# Patient Record
Sex: MALE | Race: WHITE | Employment: OTHER | URBAN - METROPOLITAN AREA
[De-identification: names, ages, dates, MRNs, and addresses within clinical notes are randomized per-mention and may not be internally consistent; named-entity substitution may affect disease eponyms.]

---

## 2018-12-05 ENCOUNTER — OFFICE VISIT (OUTPATIENT)
Dept: ORTHOPEDIC SURGERY | Age: 78
End: 2018-12-05
Payer: MEDICARE

## 2018-12-05 VITALS — BODY MASS INDEX: 21 KG/M2 | WEIGHT: 150 LBS | HEIGHT: 71 IN

## 2018-12-05 DIAGNOSIS — M75.102 TEAR OF LEFT ROTATOR CUFF, UNSPECIFIED TEAR EXTENT: ICD-10-CM

## 2018-12-05 DIAGNOSIS — M25.512 LEFT SHOULDER PAIN, UNSPECIFIED CHRONICITY: Primary | ICD-10-CM

## 2018-12-05 PROCEDURE — 99203 OFFICE O/P NEW LOW 30 MIN: CPT | Performed by: ORTHOPAEDIC SURGERY

## 2018-12-05 RX ORDER — MELOXICAM 15 MG/1
15 TABLET ORAL DAILY
Qty: 30 TABLET | Refills: 2 | Status: SHIPPED | OUTPATIENT
Start: 2018-12-05

## 2018-12-05 NOTE — PROGRESS NOTES
test: not tested       Sulcus sign: not tested       Bear Hug test: not tested       Rohm and Boateng test: not tested       Lift-Off test: not tested       Yergason's test: not tested    Strength: Forward flexion: 4/5        Abduction: 3/5        Internal Rotation: 3+/5        External Rotation: 4+/5    Neurologic & Vascular: Sensation to intact to light touch throughout median, ulnar and radial nerve distribution. The bilateral upper extremities are warm and well-perfused with brisk capillary refill. Additional Examinations:  Right Upper Extremity:  Examination of the right upper extremity does not show any tenderness, deformity or injury. Range of motion is within normal limits. There is no gross instability. There are no rashes, ulcerations or lesions. Strength and tone are normal.  Neck: Examination of the neck does not show any tenderness, deformity or injury. Range of motion is within normal limits. There is no gross instability. There are no rashes, ulcerations or lesions. Strength and tone are normal.      DIAGNOSTICS:  Xrays obtained in office today: Yes  Xrays reviewed today: Yes  4 views of the left shoulder show   Fracture: No  Dislocation: No  Acromion morphology: Type II   Acromioclavicular joint arthritis: moderate  Glenohumeral joint arthritis: mild  Humeral head superior migration: none      ASSESSMENT (Medical Decision Making)    Francis Barragan is a 66 y.o. male with the following diagnosis: Left shoulder possible rotator cuff tear versus rotator cuff tendinitis      ICD-10-CM    1.  Left shoulder pain, unspecified chronicity M25.512 XR SHOULDER LEFT (MIN 2 VIEWS)   2. Tear of left rotator cuff, unspecified tear extent M75.102        His overall course is unchanged despite conservative treatment      PLAN (Medical Decision Making)  Office Procedures:  Orders Placed This Encounter   Procedures    XR SHOULDER LEFT (MIN 2 VIEWS)       Treatment Plan:    I discussed the diagnosis and treatment note is accurate.

## 2022-12-12 ENCOUNTER — HOSPITAL ENCOUNTER (OUTPATIENT)
Age: 82
Setting detail: OBSERVATION
Discharge: HOME | End: 2022-12-15
Attending: EMERGENCY MEDICINE | Admitting: INTERNAL MEDICINE
Payer: MEDICARE

## 2022-12-12 ENCOUNTER — APPOINTMENT (OUTPATIENT)
Dept: GENERAL RADIOLOGY | Age: 82
End: 2022-12-12
Payer: MEDICARE

## 2022-12-12 DIAGNOSIS — R07.9 CHEST PAIN, UNSPECIFIED TYPE: Primary | ICD-10-CM

## 2022-12-12 DIAGNOSIS — Z85.72 HX OF LYMPHOMA, NON-HODGKINS: ICD-10-CM

## 2022-12-12 LAB
A/G RATIO: 1.2 (ref 1.1–2.2)
ALBUMIN SERPL-MCNC: 3.6 G/DL (ref 3.4–5)
ALP BLD-CCNC: 86 U/L (ref 40–129)
ALT SERPL-CCNC: 13 U/L (ref 10–40)
ANION GAP SERPL CALCULATED.3IONS-SCNC: 10 MMOL/L (ref 3–16)
APTT: 31 SEC (ref 23–34.3)
AST SERPL-CCNC: 14 U/L (ref 15–37)
BILIRUB SERPL-MCNC: 0.4 MG/DL (ref 0–1)
BUN BLDV-MCNC: 17 MG/DL (ref 7–20)
CALCIUM SERPL-MCNC: 9.1 MG/DL (ref 8.3–10.6)
CHLORIDE BLD-SCNC: 96 MMOL/L (ref 99–110)
CO2: 24 MMOL/L (ref 21–32)
CREAT SERPL-MCNC: 1.2 MG/DL (ref 0.8–1.3)
GFR SERPL CREATININE-BSD FRML MDRD: >60 ML/MIN/{1.73_M2}
GLUCOSE BLD-MCNC: 114 MG/DL (ref 70–99)
HCT VFR BLD CALC: 39.2 % (ref 40.5–52.5)
HEMOGLOBIN: 13.2 G/DL (ref 13.5–17.5)
INR BLD: 1.08 (ref 0.87–1.14)
MCH RBC QN AUTO: 31.2 PG (ref 26–34)
MCHC RBC AUTO-ENTMCNC: 33.6 G/DL (ref 31–36)
MCV RBC AUTO: 93 FL (ref 80–100)
PDW BLD-RTO: 14.2 % (ref 12.4–15.4)
PLATELET # BLD: 303 K/UL (ref 135–450)
PMV BLD AUTO: 7.3 FL (ref 5–10.5)
POTASSIUM SERPL-SCNC: 4.3 MMOL/L (ref 3.5–5.1)
PROTHROMBIN TIME: 13.9 SEC (ref 11.7–14.5)
RBC # BLD: 4.22 M/UL (ref 4.2–5.9)
SODIUM BLD-SCNC: 130 MMOL/L (ref 136–145)
TOTAL CK: 58 U/L (ref 39–308)
TOTAL PROTEIN: 6.7 G/DL (ref 6.4–8.2)
TROPONIN: <0.01 NG/ML
WBC # BLD: 8.1 K/UL (ref 4–11)

## 2022-12-12 PROCEDURE — 71045 X-RAY EXAM CHEST 1 VIEW: CPT

## 2022-12-12 PROCEDURE — 80053 COMPREHEN METABOLIC PANEL: CPT

## 2022-12-12 PROCEDURE — 85730 THROMBOPLASTIN TIME PARTIAL: CPT

## 2022-12-12 PROCEDURE — 85610 PROTHROMBIN TIME: CPT

## 2022-12-12 PROCEDURE — 82550 ASSAY OF CK (CPK): CPT

## 2022-12-12 PROCEDURE — 99285 EMERGENCY DEPT VISIT HI MDM: CPT

## 2022-12-12 PROCEDURE — 84484 ASSAY OF TROPONIN QUANT: CPT

## 2022-12-12 PROCEDURE — 85027 COMPLETE CBC AUTOMATED: CPT

## 2022-12-12 ASSESSMENT — ENCOUNTER SYMPTOMS
SORE THROAT: 0
DIARRHEA: 0
SHORTNESS OF BREATH: 0
ABDOMINAL PAIN: 0
COUGH: 0
NAUSEA: 0
VOMITING: 0
CHEST TIGHTNESS: 0
BACK PAIN: 0

## 2022-12-12 ASSESSMENT — LIFESTYLE VARIABLES
HOW MANY STANDARD DRINKS CONTAINING ALCOHOL DO YOU HAVE ON A TYPICAL DAY: PATIENT DOES NOT DRINK
HOW OFTEN DO YOU HAVE A DRINK CONTAINING ALCOHOL: NEVER

## 2022-12-12 ASSESSMENT — PAIN - FUNCTIONAL ASSESSMENT: PAIN_FUNCTIONAL_ASSESSMENT: NONE - DENIES PAIN

## 2022-12-13 ENCOUNTER — APPOINTMENT (OUTPATIENT)
Dept: NUCLEAR MEDICINE | Age: 82
End: 2022-12-13
Payer: MEDICARE

## 2022-12-13 LAB
ANION GAP SERPL CALCULATED.3IONS-SCNC: 8 MMOL/L (ref 3–16)
BUN BLDV-MCNC: 15 MG/DL (ref 7–20)
CALCIUM SERPL-MCNC: 8.6 MG/DL (ref 8.3–10.6)
CHLORIDE BLD-SCNC: 102 MMOL/L (ref 99–110)
CHOLESTEROL, TOTAL: 124 MG/DL (ref 0–199)
CO2: 27 MMOL/L (ref 21–32)
CREAT SERPL-MCNC: 0.9 MG/DL (ref 0.8–1.3)
EKG ATRIAL RATE: 83 BPM
EKG DIAGNOSIS: NORMAL
EKG P AXIS: 57 DEGREES
EKG P-R INTERVAL: 176 MS
EKG Q-T INTERVAL: 372 MS
EKG QRS DURATION: 88 MS
EKG QTC CALCULATION (BAZETT): 437 MS
EKG R AXIS: -8 DEGREES
EKG T AXIS: 43 DEGREES
EKG VENTRICULAR RATE: 83 BPM
GFR SERPL CREATININE-BSD FRML MDRD: >60 ML/MIN/{1.73_M2}
GLUCOSE BLD-MCNC: 97 MG/DL (ref 70–99)
HCT VFR BLD CALC: 40 % (ref 40.5–52.5)
HDLC SERPL-MCNC: 51 MG/DL (ref 40–60)
HEMOGLOBIN: 13.3 G/DL (ref 13.5–17.5)
LDL CHOLESTEROL CALCULATED: 63 MG/DL
MCH RBC QN AUTO: 31.1 PG (ref 26–34)
MCHC RBC AUTO-ENTMCNC: 33.2 G/DL (ref 31–36)
MCV RBC AUTO: 93.4 FL (ref 80–100)
PDW BLD-RTO: 13.9 % (ref 12.4–15.4)
PLATELET # BLD: 288 K/UL (ref 135–450)
PMV BLD AUTO: 7.4 FL (ref 5–10.5)
POTASSIUM REFLEX MAGNESIUM: 5.1 MMOL/L (ref 3.5–5.1)
RBC # BLD: 4.28 M/UL (ref 4.2–5.9)
SODIUM BLD-SCNC: 137 MMOL/L (ref 136–145)
TRIGL SERPL-MCNC: 50 MG/DL (ref 0–150)
TROPONIN: <0.01 NG/ML
TROPONIN: <0.01 NG/ML
VLDLC SERPL CALC-MCNC: 10 MG/DL
WBC # BLD: 6.9 K/UL (ref 4–11)

## 2022-12-13 PROCEDURE — G0378 HOSPITAL OBSERVATION PER HR: HCPCS

## 2022-12-13 PROCEDURE — 93017 CV STRESS TEST TRACING ONLY: CPT

## 2022-12-13 PROCEDURE — 80048 BASIC METABOLIC PNL TOTAL CA: CPT

## 2022-12-13 PROCEDURE — 96360 HYDRATION IV INFUSION INIT: CPT

## 2022-12-13 PROCEDURE — 96361 HYDRATE IV INFUSION ADD-ON: CPT

## 2022-12-13 PROCEDURE — 99205 OFFICE O/P NEW HI 60 MIN: CPT | Performed by: INTERNAL MEDICINE

## 2022-12-13 PROCEDURE — 2580000003 HC RX 258: Performed by: NURSE PRACTITIONER

## 2022-12-13 PROCEDURE — 36415 COLL VENOUS BLD VENIPUNCTURE: CPT

## 2022-12-13 PROCEDURE — 6370000000 HC RX 637 (ALT 250 FOR IP): Performed by: INTERNAL MEDICINE

## 2022-12-13 PROCEDURE — A9502 TC99M TETROFOSMIN: HCPCS | Performed by: NURSE PRACTITIONER

## 2022-12-13 PROCEDURE — 80061 LIPID PANEL: CPT

## 2022-12-13 PROCEDURE — 6370000000 HC RX 637 (ALT 250 FOR IP): Performed by: NURSE PRACTITIONER

## 2022-12-13 PROCEDURE — 93306 TTE W/DOPPLER COMPLETE: CPT

## 2022-12-13 PROCEDURE — 84484 ASSAY OF TROPONIN QUANT: CPT

## 2022-12-13 PROCEDURE — 3430000000 HC RX DIAGNOSTIC RADIOPHARMACEUTICAL: Performed by: NURSE PRACTITIONER

## 2022-12-13 PROCEDURE — 85027 COMPLETE CBC AUTOMATED: CPT

## 2022-12-13 PROCEDURE — 78452 HT MUSCLE IMAGE SPECT MULT: CPT

## 2022-12-13 PROCEDURE — 6370000000 HC RX 637 (ALT 250 FOR IP): Performed by: PHYSICIAN ASSISTANT

## 2022-12-13 RX ORDER — TRAMADOL HYDROCHLORIDE 50 MG/1
50 TABLET ORAL EVERY 6 HOURS PRN
Status: DISCONTINUED | OUTPATIENT
Start: 2022-12-13 | End: 2022-12-15 | Stop reason: HOSPADM

## 2022-12-13 RX ORDER — ACETAMINOPHEN 325 MG/1
650 TABLET ORAL EVERY 6 HOURS PRN
Status: DISCONTINUED | OUTPATIENT
Start: 2022-12-13 | End: 2022-12-15 | Stop reason: HOSPADM

## 2022-12-13 RX ORDER — SODIUM CHLORIDE 9 MG/ML
INJECTION, SOLUTION INTRAVENOUS CONTINUOUS
Status: ACTIVE | OUTPATIENT
Start: 2022-12-13 | End: 2022-12-13

## 2022-12-13 RX ORDER — POLYETHYLENE GLYCOL 3350 17 G/17G
17 POWDER, FOR SOLUTION ORAL DAILY PRN
Status: DISCONTINUED | OUTPATIENT
Start: 2022-12-13 | End: 2022-12-15 | Stop reason: HOSPADM

## 2022-12-13 RX ORDER — LOSARTAN POTASSIUM 100 MG/1
100 TABLET ORAL DAILY
Status: DISCONTINUED | OUTPATIENT
Start: 2022-12-13 | End: 2022-12-15 | Stop reason: HOSPADM

## 2022-12-13 RX ORDER — MIRTAZAPINE 15 MG/1
30 TABLET, FILM COATED ORAL NIGHTLY
Status: DISCONTINUED | OUTPATIENT
Start: 2022-12-13 | End: 2022-12-14

## 2022-12-13 RX ORDER — SIMVASTATIN 40 MG
40 TABLET ORAL NIGHTLY
Status: DISCONTINUED | OUTPATIENT
Start: 2022-12-13 | End: 2022-12-13

## 2022-12-13 RX ORDER — ONDANSETRON 2 MG/ML
4 INJECTION INTRAMUSCULAR; INTRAVENOUS EVERY 6 HOURS PRN
Status: DISCONTINUED | OUTPATIENT
Start: 2022-12-13 | End: 2022-12-15 | Stop reason: HOSPADM

## 2022-12-13 RX ORDER — ATORVASTATIN CALCIUM 80 MG/1
80 TABLET, FILM COATED ORAL NIGHTLY
Status: DISCONTINUED | OUTPATIENT
Start: 2022-12-13 | End: 2022-12-15 | Stop reason: HOSPADM

## 2022-12-13 RX ORDER — SODIUM CHLORIDE 0.9 % (FLUSH) 0.9 %
5-40 SYRINGE (ML) INJECTION EVERY 12 HOURS SCHEDULED
Status: DISCONTINUED | OUTPATIENT
Start: 2022-12-13 | End: 2022-12-15 | Stop reason: HOSPADM

## 2022-12-13 RX ORDER — NITROGLYCERIN 0.4 MG/1
0.4 TABLET SUBLINGUAL EVERY 5 MIN PRN
Status: DISCONTINUED | OUTPATIENT
Start: 2022-12-13 | End: 2022-12-15 | Stop reason: HOSPADM

## 2022-12-13 RX ORDER — SODIUM CHLORIDE 0.9 % (FLUSH) 0.9 %
5-40 SYRINGE (ML) INJECTION PRN
Status: DISCONTINUED | OUTPATIENT
Start: 2022-12-13 | End: 2022-12-15 | Stop reason: HOSPADM

## 2022-12-13 RX ORDER — ACETAMINOPHEN 650 MG/1
650 SUPPOSITORY RECTAL EVERY 6 HOURS PRN
Status: DISCONTINUED | OUTPATIENT
Start: 2022-12-13 | End: 2022-12-15 | Stop reason: HOSPADM

## 2022-12-13 RX ORDER — ENOXAPARIN SODIUM 100 MG/ML
40 INJECTION SUBCUTANEOUS DAILY
Status: DISCONTINUED | OUTPATIENT
Start: 2022-12-13 | End: 2022-12-15 | Stop reason: HOSPADM

## 2022-12-13 RX ORDER — ASPIRIN 325 MG
325 TABLET ORAL ONCE
Status: COMPLETED | OUTPATIENT
Start: 2022-12-13 | End: 2022-12-13

## 2022-12-13 RX ORDER — ATORVASTATIN CALCIUM 10 MG/1
20 TABLET, FILM COATED ORAL NIGHTLY
Status: DISCONTINUED | OUTPATIENT
Start: 2022-12-13 | End: 2022-12-13

## 2022-12-13 RX ORDER — ASPIRIN 81 MG/1
81 TABLET ORAL DAILY
Status: DISCONTINUED | OUTPATIENT
Start: 2022-12-13 | End: 2022-12-15 | Stop reason: HOSPADM

## 2022-12-13 RX ORDER — LORAZEPAM 0.5 MG/1
0.5 TABLET ORAL NIGHTLY PRN
Status: DISCONTINUED | OUTPATIENT
Start: 2022-12-13 | End: 2022-12-15 | Stop reason: HOSPADM

## 2022-12-13 RX ORDER — MONTELUKAST SODIUM 10 MG/1
10 TABLET ORAL NIGHTLY
Status: DISCONTINUED | OUTPATIENT
Start: 2022-12-13 | End: 2022-12-15 | Stop reason: HOSPADM

## 2022-12-13 RX ORDER — ONDANSETRON 4 MG/1
4 TABLET, ORALLY DISINTEGRATING ORAL EVERY 8 HOURS PRN
Status: DISCONTINUED | OUTPATIENT
Start: 2022-12-13 | End: 2022-12-15 | Stop reason: HOSPADM

## 2022-12-13 RX ORDER — PRAMIPEXOLE DIHYDROCHLORIDE 0.25 MG/1
0.25 TABLET ORAL NIGHTLY
Status: DISCONTINUED | OUTPATIENT
Start: 2022-12-13 | End: 2022-12-14

## 2022-12-13 RX ORDER — SODIUM CHLORIDE 9 MG/ML
INJECTION, SOLUTION INTRAVENOUS PRN
Status: DISCONTINUED | OUTPATIENT
Start: 2022-12-13 | End: 2022-12-15 | Stop reason: HOSPADM

## 2022-12-13 RX ADMIN — SODIUM CHLORIDE: 9 INJECTION, SOLUTION INTRAVENOUS at 02:24

## 2022-12-13 RX ADMIN — ASPIRIN 325 MG: 325 TABLET ORAL at 00:55

## 2022-12-13 RX ADMIN — MIRTAZAPINE 30 MG: 15 TABLET, FILM COATED ORAL at 22:08

## 2022-12-13 RX ADMIN — LORAZEPAM 0.5 MG: 0.5 TABLET ORAL at 22:07

## 2022-12-13 RX ADMIN — Medication 10 ML: at 22:15

## 2022-12-13 RX ADMIN — ATORVASTATIN CALCIUM 80 MG: 80 TABLET, FILM COATED ORAL at 22:08

## 2022-12-13 RX ADMIN — PRAMIPEXOLE DIHYDROCHLORIDE 0.25 MG: 0.25 TABLET ORAL at 22:08

## 2022-12-13 RX ADMIN — TETROFOSMIN 11 MILLICURIE: 1.38 INJECTION, POWDER, LYOPHILIZED, FOR SOLUTION INTRAVENOUS at 08:07

## 2022-12-13 RX ADMIN — LOSARTAN POTASSIUM 100 MG: 100 TABLET, FILM COATED ORAL at 08:36

## 2022-12-13 RX ADMIN — ASPIRIN 81 MG: 81 TABLET, COATED ORAL at 08:36

## 2022-12-13 RX ADMIN — TETROFOSMIN 33.6 MILLICURIE: 1.38 INJECTION, POWDER, LYOPHILIZED, FOR SOLUTION INTRAVENOUS at 10:17

## 2022-12-13 ASSESSMENT — PAIN - FUNCTIONAL ASSESSMENT: PAIN_FUNCTIONAL_ASSESSMENT: NONE - DENIES PAIN

## 2022-12-13 ASSESSMENT — HEART SCORE: ECG: 0

## 2022-12-13 NOTE — PLAN OF CARE
Problem: Discharge Planning  Goal: Discharge to home or other facility with appropriate resources  Outcome: Progressing  Flowsheets  Taken 12/13/2022 0250  Discharge to home or other facility with appropriate resources: Identify barriers to discharge with patient and caregiver  Taken 12/13/2022 0203  Discharge to home or other facility with appropriate resources: Identify barriers to discharge with patient and caregiver     Problem: Safety - Adult  Goal: Free from fall injury  Outcome: Progressing

## 2022-12-13 NOTE — H&P
Hospital Medicine History & Physical      PCP: Nilo Castro MD    Date of Admission: 12/12/2022    Date of Service: Pt seen/examined on 12/13/2022 and Admitted to observation with expected LOS less than two midnights due to medical therapy. Chief Complaint: Chest pain    History Of Present Illness:      80 y.o. male, with past medical history of hypertension, hyperlipidemia and CAD, who presented to [facility] with chest pain. History obtained from patient and review of EMR. The patient stated this evening he was sitting in his recliner watching television when he developed 6/10 right-sided chest pain. He describes the pain as sharp and stabbing pain that radiated to the right and left side of his neck, but was not associated with any other symptoms. The patient stated he did take 1 nitro tablet and the pain subsided. the patient stated he does have a history of CAD with a CABG in 2008. He stated he does follow with Dr. Vidya Rader from cardiology at [facility].  In the emergency room the patient had a negative troponin as well as a nonischemic EKG. The patient was admitted for further evaluation and treatment. A stress test and echocardiogram have been ordered for the a.m. The patient denied any other associated symptoms as well as any aggravating and/or alleviating factors. At the time of this assessment, the patient was resting comfortably in bed. He currently denies any chest pain, back pain, abdominal pain, shortness of breath, numbness, tingling, N/V/C/D, fever and/or chills.      Past Medical History:          Diagnosis Date    Arthritis     Benign neoplasm of colon     Bronchitis     CAD (coronary artery disease)     Cervicalgia     Chronic prostatitis     Coronary atherosclerosis of unspecified type of vessel, native or graft     Depressive disorder, not elsewhere classified     Esophageal reflux     Generalized anxiety disorder     Hyperlipidemia     Hypertension     Impotence of organic origin     Insomnia, unspecified     Irritable bowel syndrome     Lumbago     Orthodontics     UPPER FRONT BRIDGE/CAPS    Other diseases of lung, not elsewhere classified     CT OF LUNGS, 2/07, 5/07, 4/08    Unspecified essential hypertension      Past Surgical History:          Procedure Laterality Date    BALLOON ANGIOPLASTY, ARTERY  2002, 2004    PERIPH ART    BYPASS GRAFT  2006 COMPOS/2 VEINS/2 [facility] SURGERY  7/31/2012    ANTERIOR CERVICAL DISCECTOMY AND FUSION C7-T1    COLONOSCOPY  2003    CORONARY ANGIOPLASTY WITH STENT PLACEMENT  2001, 2004    TRANSCATH STENT EACH ADDN VESSL,OPEN    CORONARY ARTERY BYPASS GRAFT  10/2008    TWO    COSMETIC SURGERY      DEVIATED SEPTUM    ENDOSCOPY, COLON, DIAGNOSTIC      EYE SURGERY      CATARACT, LEFT, INSERT LENS     Medications Prior to Admission:      Prior to Admission medications    Medication Sig Start Date End Date Taking? Authorizing Provider   meloxicam (MOBIC) 15 MG tablet Take 1 tablet by mouth daily 12/5/18   Winnieparul Christina PA-C   Montelukast Sodium (SINGULAIR PO) Take  by mouth. Historical Provider, MD   nitroGLYCERIN (NITROSTAT) 0.4 MG SL tablet Place 1 tablet under the tongue every 5 minutes as needed for Chest pain. 1/7/14   Khloe Maikel, DO   aspirin EC 81 MG EC tablet Take 1 tablet by mouth daily. 1/7/14   Karrie Stapleton, DO   lansoprazole (PREVACID SOLUTAB) 30 MG disintegrating tablet Take 30 mg by mouth daily. Historical Provider, MD   simvastatin (ZOCOR) 40 MG tablet Take 40 mg by mouth nightly. Historical Provider, MD   traMADol (ULTRAM) 50 MG tablet Take 50 mg by mouth every 6 hours as needed. Historical Provider, MD   losartan (COZAAR) 100 MG tablet Take 100 mg by mouth daily. Historical Provider, MD   pramipexole (MIRAPEX) 0.25 MG tablet Take 0.25 mg by mouth nightly. Historical Provider, MD   triazolam (HALCION) 0.25 MG tablet Take 0.25 mg by mouth nightly as needed. Historical Provider, MD   polyethylene glycol (GLYCOLAX) powder Take 17 g by mouth daily. Historical Provider, MD   nitroGLYCERIN (NITROSTAT) 0.4 MG SL tablet Place 0.4 mg under the tongue every 5 minutes as needed. Historical Provider, MD     Allergies:  Bromocriptine, Gabapentin, and Zetia [ezetimibe]    Social History:      The patient currently lives at home    TOBACCO:   reports that he quit smoking about 29 years ago. He has a 20.00 pack-year smoking history. He has never used smokeless tobacco.  ETOH:   reports current alcohol use. E-cigarette/Vaping       Questions Responses    E-cigarette/Vaping Use     Start Date     Passive Exposure     Quit Date     Counseling Given     Comments           Family History:      Reviewed and negative in regards to presenting illness/complaint. Problem Relation Age of Onset    Heart Disease Mother     Osteoarthritis Brother     Heart Disease Brother     High Blood Pressure Brother     High Blood Pressure Brother      REVIEW OF SYSTEMS COMPLETED:   Pertinent positives as noted in the HPI. All other systems reviewed and negative. PHYSICAL EXAM PERFORMED:    /68   Pulse 65   Temp 98.2 °F (36.8 °C) (Oral)   Resp 16   Ht 5' 10\" (1.778 m)   Wt 152 lb (68.9 kg)   SpO2 95%   BMI 21.81 kg/m²     General appearance: Pleasant, elderly male in no apparent distress, appears stated age and cooperative. HEENT: Pupils equal, round, and reactive to light. Extra ocular muscles intact. Conjunctivae/corneas clear. Neck: Supple, with full range of motion. No jugular venous distention. Trachea midline. Respiratory:  Normal respiratory effort. Clear to auscultation, bilaterally without Rales/Wheezes/Rhonchi. Cardiovascular:  Regular rate and rhythm with normal S1/S2 without murmurs, rubs or gallops. Abdomen: Soft, non-tender, non-distended with normal bowel sounds. Musculoskeletal:  No clubbing, cyanosis or edema bilaterally.   Full range of motion without deformity. Skin: Skin color, texture, turgor normal.  No significant rashes or lesions. Neurologic:  Neurovascularly intact  Cranial nerves: II-XII intact, grossly non-focal.  Psychiatric:  Alert and oriented, thought content appropriate, normal insight  Capillary Refill: Brisk,3 seconds, normal  Peripheral Pulses: +2 palpable, equal bilaterally     Labs:     Recent Labs     12/12/22 2325   WBC 8.1   HGB 13.2*   HCT 39.2*        Recent Labs     12/12/22 2325   *   K 4.3   CL 96*   CO2 24   BUN 17   CREATININE 1.2   CALCIUM 9.1     Recent Labs     12/12/22 2325   AST 14*   ALT 13   BILITOT 0.4   ALKPHOS 86     Recent Labs     12/12/22 2325   INR 1.08     Recent Labs     12/12/22 2325   CKTOTAL 58   TROPONINI <0.01     Urinalysis:      Lab Results   Component Value Date/Time    BLOODU Trace intact 03/19/2013 01:34 PM    SPECGRAV <=1.005 03/19/2013 01:34 PM    GLUCOSEU Negative 03/19/2013 01:34 PM     Radiology:     CXR: I have reviewed the CXR with the following interpretation: minimal basilar atelectasis, otherwise no acute process    EKG:  I have reviewed the EKG with the following interpretation: The Ekg interpreted in the absence of a cardiologist shows. Sinus rhythm with Premature atrial complexes. Nonspecific T wave abnormality. Abnormal ECG. When compared with ECG of 09-FEB-2018 06:48, No significant change was found    XR CHEST PORTABLE   Final Result   Minimal basilar atelectasis, otherwise no acute process. Consults:    None    ASSESSMENT:    Active Hospital Problems    Diagnosis Date Noted    Hyperlipidemia [E78.5] 03/19/2013    HTN (hypertension) [I10] 03/19/2013    Chest pain [R07.9] 03/19/2013     PLAN:    Chest pain in setting of known CAD.  S/p OHS  -atypical  -serial troponin - initial negative  -EKG w/o ischemic changes  -ASA given in ED  -nitro in EMS  -FLP in am  -NPO after MN  -stress in am  -echo in am  -ASA and statin daily  -prn nitro  -tele monitoring    Essential HTN  -continue losartan    HLD  -continue simvastatin    Hyponatremia  -monitor with IVF  -bmp in am    Chronic normocytic anemia  -no s/s of bleeding at this time  -cbc in am    DVT Prophylaxis: Lovenox    Diet: No diet orders on file    Code Status: Prior    PT/OT Eval Status: No indication for need at this time    Dispo - 1-2 days pending clinical improvement   Jasbir Cevallos, APRN - CNP    Thank you Tamica Marrero MD for the opportunity to be involved in this patient's care.  If you have any questions or concerns please feel free to contact me at [phone].  -------------------------Dr. Sandra Guy-----------------------------

## 2022-12-13 NOTE — PLAN OF CARE
Problem: Safety - Adult  Goal: Free from fall injury  12/13/2022 1231 by Erica Barrera RN  Outcome: Progressing

## 2022-12-13 NOTE — ED NOTES
Patient sleeping in cart. Easily arousable. Patient denies any chest pain at this time. Patient resting comfortably in cart. Warm blanket provided. No other needs at this time. Will continue to monitor.      Raad Arciniega RN  12/13/22 5658

## 2022-12-13 NOTE — PROGRESS NOTES
Reviewed after overnight admission. Cardiac stress test abnormal with some scar and possible surrounding ischemia. Unclear if patient requires cardiac cath. However, we will await cardiology to see before making further decisions.     Will increase statin dose to high intensity    Keep the patient n.p.o. until seen by cardiology      Electronically signed by Raphael Barreto MD on 12/13/2022 at 1:31 PM

## 2022-12-13 NOTE — CONSULTS
[address] [facility]  [phone]      Attending Physician: Shan Blizzard, MD  Reason for Consultation/Chief Complaint: Chest pain, abnormal stress test    Subjective   History of Present Illness:  Sanitago Jaquez is a 80 y.o. male with a history of CAD s/p PCI to LAD in 2001 and PCI to RCA in 2004 followed by CABG in 2008, ischemic cardiomyopathy, non-Hodgkin's lymphoma (reportedly in remission), essential hypertension, hyperlipidemia, COPD, and tobacco use who presented with chest pain. The patient reports that yesterday while sitting in his chair, he developed sudden onset sharp midsternal chest pain that radiated to his neck. He denies any associated shortness of breath or diaphoresis. His pain lasted for approximately 15 minutes and resolved shortly after taking an SL nitroglycerin tablet. On arrival to the ED, the patient was afebrile and hemodynamically stable with oxygen saturation 95% on room air. His EKG showed normal sinus rhythm with PACs and non-specific T wave abnormality. Troponin T was negative x3. Chest x-ray showed minimal basilar atelectasis, but otherwise no acute pulmonary process. A GXT nuclear SPECT stress test was performed earlier today during which the patient exercised on a Khris protocol for 5 minutes and achieved 87% of his age-predicted maximal heart rate. No ischemic EKG changes were seen. Nuclear SPECT imaging demonstrated a moderate-sized basal to mid-anterolateral area of mixed ischemia/scar.     Cardiology has now been consulted for further evaluation of the patient's chest pain and abnormal nuclear SPECT stress test.    Past Medical History:   has a past medical history of Arthritis, Benign neoplasm of colon, Bronchitis, CAD (coronary artery disease), Cervicalgia, Chronic prostatitis, Coronary atherosclerosis of unspecified type of vessel, native or graft, Depressive disorder, not elsewhere classified, Esophageal reflux, Generalized anxiety disorder, Hyperlipidemia, Hypertension, Impotence of organic origin, Insomnia, unspecified, Irritable bowel syndrome, Lumbago, Orthodontics, Other diseases of lung, not elsewhere classified, and Unspecified essential hypertension. Surgical History:   has a past surgical history that includes Cardiac surgery; Cosmetic surgery; Endoscopy, colon, diagnostic; Balloon angioplasty, artery (2002, 2004); Coronary artery bypass graft (10/2008); eye surgery; Colonoscopy (2003); Bypass Graft (2006); Coronary angioplasty with stent (2001, 2004); and Cervical spine surgery (7/31/2012). Social History:   reports that he quit smoking about 29 years ago. He has a 20.00 pack-year smoking history. He has never used smokeless tobacco. He reports current alcohol use. He reports that he does not use drugs. Family History:  family history includes Heart Disease in his brother and mother; High Blood Pressure in his brother and brother; Osteoarthritis in his brother. Home Medications:  Were reviewed and are listed in nursing record and/or below  Prior to Admission medications    Medication Sig Start Date End Date Taking? Authorizing Provider   meloxicam (MOBIC) 15 MG tablet Take 1 tablet by mouth daily 12/5/18   Stephanie Vann PA-C   Montelukast Sodium (SINGULAIR PO) Take  by mouth. Historical Provider, MD   nitroGLYCERIN (NITROSTAT) 0.4 MG SL tablet Place 1 tablet under the tongue every 5 minutes as needed for Chest pain. 1/7/14   Demetra Monte, DO   aspirin EC 81 MG EC tablet Take 1 tablet by mouth daily. 1/7/14   Karrie Stapleton, DO   lansoprazole (PREVACID SOLUTAB) 30 MG disintegrating tablet Take 30 mg by mouth daily. Historical Provider, MD   simvastatin (ZOCOR) 40 MG tablet Take 40 mg by mouth nightly. Historical Provider, MD   traMADol (ULTRAM) 50 MG tablet Take 50 mg by mouth every 6 hours as needed.     Patient not taking: Reported on 12/13/2022    Historical Provider, MD   losartan (COZAAR) 100 MG tablet Take 100 mg by mouth daily. Historical Provider, MD   pramipexole (MIRAPEX) 0.25 MG tablet Take 0.25 mg by mouth nightly. Historical Provider, MD   triazolam (HALCION) 0.25 MG tablet Take 0.25 mg by mouth nightly as needed. Historical Provider, MD   polyethylene glycol (GLYCOLAX) powder Take 17 g by mouth daily. Historical Provider, MD   nitroGLYCERIN (NITROSTAT) 0.4 MG SL tablet Place 0.4 mg under the tongue every 5 minutes as needed. Historical Provider, MD        CURRENT Medications:  aspirin EC tablet 81 mg, Daily  losartan (COZAAR) tablet 100 mg, Daily  montelukast (SINGULAIR) tablet 10 mg, Nightly  pramipexole (MIRAPEX) tablet 0.25 mg, Nightly  traMADol (ULTRAM) tablet 50 mg, Q6H PRN  sodium chloride flush 0.9 % injection 5-40 mL, 2 times per day  sodium chloride flush 0.9 % injection 5-40 mL, PRN  0.9 % sodium chloride infusion, PRN  ondansetron (ZOFRAN-ODT) disintegrating tablet 4 mg, Q8H PRN   Or  ondansetron (ZOFRAN) injection 4 mg, Q6H PRN  acetaminophen (TYLENOL) tablet 650 mg, Q6H PRN   Or  acetaminophen (TYLENOL) suppository 650 mg, Q6H PRN  polyethylene glycol (GLYCOLAX) packet 17 g, Daily PRN  perflutren lipid microspheres (DEFINITY) injection 1.5 mL, ONCE PRN  nitroGLYCERIN (NITROSTAT) SL tablet 0.4 mg, Q5 Min PRN  enoxaparin (LOVENOX) injection 40 mg, Daily  atorvastatin (LIPITOR) tablet 80 mg, Nightly        Allergies:  Bromocriptine, Gabapentin, and Zetia [ezetimibe]     Review of Systems:   A 14 point review of symptoms was completed. Pertinent positives were identified in the HPI. All other review of symptoms negative unless otherwise noted below. Objective   PHYSICAL EXAM:    Vitals:    12/13/22 1101   BP: (!) 148/79   Pulse: 74   Resp:    Temp: 97.7 °F (36.5 °C)   SpO2: 98%    Weight: 150 lb (68 kg)       General: Adult male sitting up in chair in no acute distress.  Pleasant and interactive on exam.  HEENT: Normocephalic, atraumatic, non-icteric, hearing intact, nares normal, mucous membranes moist.  Neck: Supple, trachea midline. No adenopathy. No thyromegaly. No carotid bruits. No JVD. Heart: Regular rate and rhythm. Normal S1 and S2. Grade  II/VI holosystolic murmur. No rubs or gallops. Chest: Midline sternotomy scar present. Lungs: Normal respiratory effort. Clear to auscultation bilaterally. No wheezes, rales, or rhonchi. Abdomen: Soft, non-tender. Normoactive bowel sounds. No masses or organomegaly. Skin: No rashes, wounds, or lesions. Pulses: 2+ and symmetric. Extremities: No clubbing, cyanosis, or edema. Musculoskeletal: Spontaneously moves all four extremities. Psych: Normal mood and affect. Neuro: Alert and oriented to person, place, and time. No focal deficits noted.       Labs   CBC:   Lab Results   Component Value Date/Time    WBC 6.9 12/13/2022 06:37 AM    RBC 4.28 12/13/2022 06:37 AM    HGB 13.3 12/13/2022 06:37 AM    HCT 40.0 12/13/2022 06:37 AM    MCV 93.4 12/13/2022 06:37 AM    RDW 13.9 12/13/2022 06:37 AM     12/13/2022 06:37 AM     CMP:  Lab Results   Component Value Date/Time     12/13/2022 06:37 AM    K 5.1 12/13/2022 06:37 AM     12/13/2022 06:37 AM    CO2 27 12/13/2022 06:37 AM    BUN 15 12/13/2022 06:37 AM    CREATININE 0.9 12/13/2022 06:37 AM    GFRAA >60 02/09/2018 07:05 AM    GFRAA >60 03/19/2013 10:50 AM    AGRATIO 1.2 12/12/2022 11:25 PM    LABGLOM >60 12/13/2022 06:37 AM    GLUCOSE 97 12/13/2022 06:37 AM    PROT 6.7 12/12/2022 11:25 PM    PROT 7.4 03/19/2013 10:50 AM    CALCIUM 8.6 12/13/2022 06:37 AM    BILITOT 0.4 12/12/2022 11:25 PM    ALKPHOS 86 12/12/2022 11:25 PM    AST 14 12/12/2022 11:25 PM    ALT 13 12/12/2022 11:25 PM     PT/INR:  No results found for: PTINR  HgBA1c:  Lab Results   Component Value Date    LABA1C 5.3 01/07/2014     Lab Results   Component Value Date    CKTOTAL 58 12/12/2022    TROPONINI <0.01 12/13/2022         Cardiac Data     Last EKG: Normal sinus rhythm, no ischemic changes    Echo:  TTE 8/8/22:  Summary:   Overall left ventricular ejection fraction is estimated to be 45-50%. Borderline global hypokinesis of the left ventricle. Mild mitral annular calcification. The Aortic Valve leaflets appear sclerotic. Trace aortic regurgitation. Mild aortic root dilatation. No pericardial effusion. TTE 12/13/22:   Conclusions   Summary   Normal left ventricular systolic function with ejection fraction of 55%. No regional wall motion abnormalites are seen. Grade I diastolic dysfunction with normal filling pressure. Borderline concentric LV hypertrophy. Mild mitral annular calcification. Aortic valve appears sclerotic but opens adequately. Mild aortic regurgitation. No tricuspid regurgitation to estimate systolic pulmonary artery pressure   (SPAP). Stress Test:  Nuclear SPECT Stress 11/18/20: IMPRESSION:   The result of this study is abnormal   The risk of this study is moderate   Defect in the cardiac apex appears to be due to artifact   Moderate scar in the LAD and/or circumflex distribution without significant   ischemia   Mildly decreased left ventricular systolic function with anterolateral   hypokinesis   No  prior studies available for comparison     Cath: Previous cath records not currently available. Other Studies:   Chest X-ray 12/12/22: Minimal basilar atelectasis, otherwise no acute process. Assessment and Plan      1. Chest pain/abnormal nuclear SPECT stress test - Rule out for ACS. Nuclear SPECT imaging demonstrated a moderate-sized basal to mid-anterolateral area of mixed ischemia/scar.  -Given recent symptoms and abnormal nuclear SPECT stress test, would recommend proceeding with invasive angiography for definitive assessment of patient's coronary anatomy.   The risk/benefits of the aforementioned procedure with possible percutaneous coronary intervention were thoroughly reviewed with both the patient and his family and he agrees to proceed. -Please keep n.p.o. after midnight and will arrange for invasive angiography tomorrow  -Continue aspirin 81 mg daily and atorvastatin 80 mg qHS  -Hold off beta-blocker in setting of transient bradycardia  -Continue to monitor on telemetry and repeat EKG for any rhythm changes or new episodes of chest pain    2. CAD - S/p PCI to LAD in 2001 PCI to RCA in 2004 followed by CABG in 2008.  -On aspirin and statin as above    3. Ischemic cardiomyopathy - Repeat TTE today showed improvement in LVEF to 55%. Currently appears to be near a clinically euvolemic state.  -Repeating TTE as above  -Continue losartan 100 mg daily  -Hold off on beta-blocker in setting of transfer bradycardia    4. History of non-Hodgkin's lymphoma  -Reportedly in remission    5. Essential hypertension  -Continue losartan 100 mg daily    6. Hyperlipidemia  -Continue atorvastatin 80 mg qHS    7. COPD  -Stable    8. Mild aortic root dilatation  -Aortic root noted to be mildly dilated on previous TTE from [facility], but appeared to be normal in size on TTE obtained this admission    9. Former tobacco use  -Continue to encourage avoidance of tobacco products      Thank you for allowing us to participate in the care of [employer]. Please call me with any questions [phone]. Daina Cox DO  [facility]  [phone] [facility]  [phone] [address] [place] [address]  12/13/2022 3:51 PM      I will address the patient's cardiac risk factors and adjusted pharmacologic treatment as needed. In addition, I have reinforced the need for patient directed risk factor modification. All questions and concerns were addressed to the patient/family. Alternatives to my treatment were discussed. The note was completed using EMR. Every effort was made to ensure accuracy; however, inadvertent computerized transcription errors may be present.

## 2022-12-13 NOTE — ED PROVIDER NOTES
**ADVANCED PRACTICE PROVIDER, I HAVE EVALUATED THIS PATIENT**        [facility] CARD TELEMETRY  EMERGENCY DEPARTMENT ENCOUNTER      Pt Name: Nasra Pratt  VBI:[id number]  [place] 1940  Date of evaluation: 12/12/2022  Provider: MARVIN Birmingham  Note Started: 11:39 PM EST 12/13/2022        Chief Complaint:    Chief Complaint   Patient presents with    Chest Pain     Chest pain x 30 minutes. Took one nitro tab and pain subsided. Hx open heart surgery, multiple stents. Nursing Notes, Past Medical Hx, Past Surgical Hx, Social Hx, Allergies, and Family Hx were all reviewed and agreed with or any disagreements were addressed in the HPI.    HPI: (Location, Duration, Timing, Severity, Quality, Assoc Sx, Context, Modifying factors)    Chief Complaint of chest pain. This is a  80 y.o. male who presents via private vehicle complaining of chest pain that began 30 minutes prior to arrival.  The patient has past medical history of CAD with multiple stents and open heart surgery in 2008. He states he was resting in his recliner at home when the chest pain began. He states it was sharp and stabbing in the center of his chest radiating into his back. He did take nitro which alleviated his symptoms. He denies any shortness of breath or cough. He is now pain-free. He states he has not had a stress test in several years. He typically follows with [facility] cardiology. Denies any lightheadedness or dizziness.     PastMedical/Surgical History:      Diagnosis Date    Arthritis     Benign neoplasm of colon     Bronchitis     CAD (coronary artery disease)     Cervicalgia     Chronic prostatitis     Coronary atherosclerosis of unspecified type of vessel, native or graft     Depressive disorder, not elsewhere classified     Esophageal reflux     Generalized anxiety disorder     Hyperlipidemia     Hypertension     Impotence of organic origin     Insomnia, unspecified     Irritable bowel syndrome     Lumbago Orthodontics     UPPER FRONT BRIDGE/CAPS    Other diseases of lung, not elsewhere classified     CT OF LUNGS, 2/07, 5/07, 4/08    Unspecified essential hypertension          Procedure Laterality Date    BALLOON ANGIOPLASTY, ARTERY  2002, 2004    PERIPH ART    BYPASS GRAFT  2006 COMPOS/2 VEINS/2 46 [facility] SURGERY  7/31/2012    ANTERIOR CERVICAL DISCECTOMY AND FUSION C7-T1    COLONOSCOPY  2003    CORONARY ANGIOPLASTY WITH STENT PLACEMENT  2001, 2004    TRANSCATH STENT EACH ADDN VESSL,OPEN    CORONARY ARTERY BYPASS GRAFT  10/2008    TWO    COSMETIC SURGERY      DEVIATED SEPTUM    ENDOSCOPY, COLON, DIAGNOSTIC      EYE SURGERY      CATARACT, LEFT, INSERT LENS       Medications:  Current Discharge Medication List        CONTINUE these medications which have NOT CHANGED    Details   meloxicam (MOBIC) 15 MG tablet Take 1 tablet by mouth daily  Qty: 30 tablet, Refills: 2      Montelukast Sodium (SINGULAIR PO) Take  by mouth. !! nitroGLYCERIN (NITROSTAT) 0.4 MG SL tablet Place 1 tablet under the tongue every 5 minutes as needed for Chest pain. Qty: 25 tablet, Refills: 0      aspirin EC 81 MG EC tablet Take 1 tablet by mouth daily. Qty: 30 tablet, Refills: 3      lansoprazole (PREVACID SOLUTAB) 30 MG disintegrating tablet Take 30 mg by mouth daily. simvastatin (ZOCOR) 40 MG tablet Take 40 mg by mouth nightly. traMADol (ULTRAM) 50 MG tablet Take 50 mg by mouth every 6 hours as needed. losartan (COZAAR) 100 MG tablet Take 100 mg by mouth daily. pramipexole (MIRAPEX) 0.25 MG tablet Take 0.25 mg by mouth nightly. triazolam (HALCION) 0.25 MG tablet Take 0.25 mg by mouth nightly as needed. polyethylene glycol (GLYCOLAX) powder Take 17 g by mouth daily. !! nitroGLYCERIN (NITROSTAT) 0.4 MG SL tablet Place 0.4 mg under the tongue every 5 minutes as needed. !! - Potential duplicate medications found. Please discuss with provider. Review of Systems:  (2-9 systems needed)  Review of Systems   Constitutional:  Negative for chills and fever. HENT:  Negative for congestion, nosebleeds and sore throat. Eyes:  Negative for visual disturbance. Respiratory:  Negative for cough, chest tightness and shortness of breath. Cardiovascular:  Positive for chest pain. Negative for palpitations. Gastrointestinal:  Negative for abdominal pain, diarrhea, nausea and vomiting. Genitourinary:  Negative for dysuria, frequency, hematuria and urgency. Musculoskeletal:  Negative for back pain and neck pain. Skin:  Negative for rash. Neurological:  Negative for dizziness, weakness, light-headedness and headaches. \"Positives and Pertinent negatives as per HPI\"    Physical Exam:  Physical Exam  Vitals and nursing note reviewed. Constitutional:       Appearance: Normal appearance. He is not diaphoretic. HENT:      Head: Normocephalic and atraumatic. Nose: Nose normal.      Mouth/Throat:      Mouth: Mucous membranes are moist.   Eyes:      General:         Right eye: No discharge. Left eye: No discharge. Extraocular Movements: Extraocular movements intact. Pupils: Pupils are equal, round, and reactive to light. Cardiovascular:      Rate and Rhythm: Normal rate and regular rhythm. Pulses: Normal pulses. Heart sounds: Normal heart sounds. No murmur heard. No friction rub. No gallop. Pulmonary:      Effort: Pulmonary effort is normal. No respiratory distress. Breath sounds: Normal breath sounds. No stridor. No wheezing, rhonchi or rales. Abdominal:      General: Abdomen is flat. Palpations: Abdomen is soft. Tenderness: There is no abdominal tenderness. There is no guarding or rebound. Musculoskeletal:         General: Normal range of motion. Cervical back: Normal range of motion and neck supple. Right lower leg: No edema. Left lower leg: No edema.    Skin:     General: Skin is warm and dry. Coloration: Skin is not pale. Neurological:      Mental Status: He is alert and oriented to person, place, and time. Psychiatric:         Mood and Affect: Mood normal.         Behavior: Behavior normal.       MEDICAL DECISION MAKING    Vitals:    Vitals:    12/12/22 2317 12/13/22 0023 12/13/22 0117 12/13/22 0134   BP: 121/76 113/68 117/71 124/61   Pulse: 86 65 64 61   Resp: 18 16 16 16   Temp: 98.2 °F (36.8 °C)  98.2 °F (36.8 °C) 97.7 °F (36.5 °C)   TempSrc: Oral  Oral Oral   SpO2: 95% 95% 96% 94%   Weight: 152 lb (68.9 kg)   150 lb (68 kg)   Height: 5' 10\" (1.778 m)          LABS:  Labs Reviewed   CBC - Abnormal; Notable for the following components:       Result Value    Hemoglobin 13.2 (*)     Hematocrit 39.2 (*)     All other components within normal limits   COMPREHENSIVE METABOLIC PANEL - Abnormal; Notable for the following components:    Sodium 130 (*)     Chloride 96 (*)     Glucose 114 (*)     AST 14 (*)     All other components within normal limits   APTT   PROTIME-INR   CK   TROPONIN   TROPONIN   TROPONIN   CBC   BASIC METABOLIC PANEL W/ REFLEX TO MG FOR LOW K   LIPID PANEL        Remainder of labs reviewed and were negative at this time or not returned at the time of this note. RADIOLOGY:   Non-plain film images such as CT, Ultrasound and MRI are read by the radiologist. MARVIN Schwartz have directly visualized the radiologic plain film image(s) with the below findings:      Interpretation per the Radiologist below, if available at the time of this note:    XR CHEST PORTABLE   Final Result   Minimal basilar atelectasis, otherwise no acute process. NM Cardiac Stress Test Nuclear Imaging    (Results Pending)        No results found. MEDICAL DECISION MAKING / ED COURSE:      Is this patient to be included in the SEP-1 Core Measure due to severe sepsis or septic shock?    No   Exclusion criteria - the patient is NOT to be included for SEP-1 Core Measure due to:  2+ SIRS criteria are not met      PROCEDURES:   Procedures    None    Patient was given:  Medications   aspirin EC tablet 81 mg (has no administration in time range)   losartan (COZAAR) tablet 100 mg (has no administration in time range)   montelukast (SINGULAIR) tablet 10 mg (has no administration in time range)   pramipexole (MIRAPEX) tablet 0.25 mg (has no administration in time range)   traMADol (ULTRAM) tablet 50 mg (has no administration in time range)   sodium chloride flush 0.9 % injection 5-40 mL (has no administration in time range)   sodium chloride flush 0.9 % injection 5-40 mL (has no administration in time range)   0.9 % sodium chloride infusion (has no administration in time range)   ondansetron (ZOFRAN-ODT) disintegrating tablet 4 mg (has no administration in time range)     Or   ondansetron (ZOFRAN) injection 4 mg (has no administration in time range)   acetaminophen (TYLENOL) tablet 650 mg (has no administration in time range)     Or   acetaminophen (TYLENOL) suppository 650 mg (has no administration in time range)   polyethylene glycol (GLYCOLAX) packet 17 g (has no administration in time range)   perflutren lipid microspheres (DEFINITY) injection 1.5 mL (has no administration in time range)   0.9 % sodium chloride infusion ( IntraVENous New Bag 12/13/22 8622)   nitroGLYCERIN (NITROSTAT) SL tablet 0.4 mg (has no administration in time range)   enoxaparin (LOVENOX) injection 40 mg (has no administration in time range)   atorvastatin (LIPITOR) tablet 20 mg (has no administration in time range)   aspirin tablet 325 mg (325 mg Oral Given 12/13/22 9316)       Patient was evaluated in the emergency department today for chest pain. Vital signs are stable at triage. He is now pain-free after receiving nitro. He is given aspirin in the emergency department. Work-up including EKG, chest x-ray and lab work are unremarkable. The patient's heart score is 5.   Given his significant history and concerning story I am recommending admission for ACS rule out. The patient is ultimately seen by myself and attending physician who agreed with evaluation, treatment, and disposition decisions. All questions are answered and hospitalist is consulted at this time. The patient tolerated their visit well. I evaluated the patient. The physician was available for consultation as needed. The patient and / or the family were informed of the results of any tests, a time was given to answer questions, a plan was proposed and they agreed with plan. I am the Primary Clinician of Record. CLINICAL IMPRESSION:  1. Chest pain, unspecified type    2.  Hx of lymphoma, non-Hodgkins        Results for orders placed or performed during the hospital encounter of 12/12/22   CBC   Result Value Ref Range    WBC 8.1 4.0 - 11.0 K/uL    RBC 4.22 4.20 - 5.90 M/uL    Hemoglobin 13.2 (L) 13.5 - 17.5 g/dL    Hematocrit 39.2 (L) 40.5 - 52.5 %    MCV 93.0 80.0 - 100.0 fL    MCH 31.2 26.0 - 34.0 pg    MCHC 33.6 31.0 - 36.0 g/dL    RDW 14.2 12.4 - 15.4 %    Platelets 623 176 - 287 K/uL    MPV 7.3 5.0 - 10.5 fL   Comprehensive Metabolic Panel   Result Value Ref Range    Sodium 130 (L) 136 - 145 mmol/L    Potassium 4.3 3.5 - 5.1 mmol/L    Chloride 96 (L) 99 - 110 mmol/L    CO2 24 21 - 32 mmol/L    Anion Gap 10 3 - 16    Glucose 114 (H) 70 - 99 mg/dL    BUN 17 7 - 20 mg/dL    Creatinine 1.2 0.8 - 1.3 mg/dL    Est, Glom Filt Rate >60 >60    Calcium 9.1 8.3 - 10.6 mg/dL    Total Protein 6.7 6.4 - 8.2 g/dL    Albumin 3.6 3.4 - 5.0 g/dL    Albumin/Globulin Ratio 1.2 1.1 - 2.2    Total Bilirubin 0.4 0.0 - 1.0 mg/dL    Alkaline Phosphatase 86 40 - 129 U/L    ALT 13 10 - 40 U/L    AST 14 (L) 15 - 37 U/L   APTT   Result Value Ref Range    aPTT 31.0 23.0 - 34.3 sec   Protime-INR   Result Value Ref Range    Protime 13.9 11.7 - 14.5 sec    INR 1.08 0.87 - 1.14   CK   Result Value Ref Range    Total CK 58 39 - 308 U/L   Troponin   Result Value Ref Range    Troponin <0.01 <0.01 ng/mL   Troponin   Result Value Ref Range    Troponin <0.01 <0.01 ng/mL   EKG 12 Lead   Result Value Ref Range    Ventricular Rate 83 BPM    Atrial Rate 83 BPM    P-R Interval 176 ms    QRS Duration 88 ms    Q-T Interval 372 ms    QTc Calculation (Bazett) 437 ms    P Axis 57 degrees    R Axis -8 degrees    T Axis 43 degrees    Diagnosis       Sinus rhythm with Premature atrial complexesNonspecific T wave abnormalityAbnormal ECGWhen compared with ECG of 09-FEB-2018 06:48,No significant change was found       I spoke with Dr. German Schaeffer hospitalist. We discussed the history, physical exam, diagnostics, as well as their emergency department course. Based upon that discussion, we've decided to admit Sylvia Gregory for further observation and evaluation of Russ Crimes Reese's   1. Chest pain, unspecified type    2. Hx of lymphoma, non-Hodgkins    . DISPOSITION Admitted 12/13/2022 01:05:15 AM      PATIENT REFERRED TO:  No follow-up provider specified.     DISCHARGE MEDICATIONS:  Current Discharge Medication List          DISCONTINUED MEDICATIONS:  Current Discharge Medication List                 (Please note the MDM and HPI sections of this note were completed with a voice recognition program.  Efforts were made to edit the dictations but occasionally words are mis-transcribed.)    Electronically signed, Rene Dillon,          Rene Dillon  12/13/22 0572

## 2022-12-13 NOTE — CARE COORDINATION
CASE MANAGEMENT INITIAL ASSESSMENT      Reviewed chart and completed assessment with patient  Family present: wife      Health Care Decision Maker :   Primary Decision Maker: Sue Leigh - Spouse - [phone]    Secondary Decision Maker: Lisa Marquez - Child - [phone]          Can this person be reached and be able to respond quickly, such as within a few minutes or hours? Yes      Admit date/status: 12/12/22 Obs  Diagnosis: chest pain   Is this a Readmission?:  No      Insurance: [facility] required for SNF: Yes       3 night stay required: No    Living arrangements, Adls, care needs, prior to admission: lives in a house with his wife     Durable Medical Equipment at home:  none    Services in the home and/or outpatient, prior to admission: none    Current PCP: [facility] needs:  patient      Dialysis Facility (if applicable)   Name:  Address:  Dialysis Schedule:  Phone:  Fax:    PT/OT recs: 2 [place] Notification (HEN): not initiated     Barriers to discharge: none    Plan/comments: Patient is independent at home. Cardiology to see patient and determine plan. CM will continue to follow.       ECOC on chart for MD signature

## 2022-12-13 NOTE — ED PROVIDER NOTES
I independently performed a history and physical on Genevieve Pham. All diagnostic, treatment, and disposition decisions were made by myself in conjunction with the advanced practice provider. See PABLO's note for complete documentation for this encounter. I reviewed pertinent nurse's notes, triage notes, vital signs, past medical history, family and social history, medications, and allergies. Complete review of systems was conducted by the mid-level provider and/or myself. Review of systems is negative except as documented in the history of present illness. EKG: Twelve-lead EKG as read and interpreted by myself shows: Normal sinus rhythm at a rate of 83 bpm, AL interval QRS QTC normal.  Normal axis, no acute ischemic findings. No significant changes when compared to prior EKG. Patient was placed on cardiac and blood pressure monitoring and interpreted by myself as follows: Normal sinus rhythm. MDM:    Adult male who comes in with chest pain concerning for ACS. Though his symptoms have resolved cardiac work-up ensued including laboratory studies chest x-ray EKG and cardiac monitoring. Results showed no significant acute findings except for mild anemia. He has mild hyponatremia and hypochloremia. None of these would explain the patient's symptoms. The patient did receive nitroglycerin and aspirin prior to arrival.  No medications were needed here. The patient is requesting to leave however this patient has significant underlying risk factors with elevated heart score. I do not believe it is in the patient's best interest to be discharged at this time. This is discussed with him and his wife. They ultimately agreed to be admitted to the hospital.  Hospitalist on duty did agree to admit this patient after discussion of the case. FINAL IMPRESSION     1. Chest pain, unspecified type    2.  Hx of lymphoma, non-Hodgkins         DISPOSITION/FOLLOW-UP PLAN    DISPOSITION Admitted 12/13/2022 01:05:15 AM      No follow-up provider specified. Electronically signed by: Mag Arana M.D.   I am the primary physician of record        Lorena Ruggiero MD  12/13/22 5301

## 2022-12-13 NOTE — PROGRESS NOTES
Spoke with David Yancey RN with Cardiology whom stated pt to be seen by Em MONTEZ. Pt okay to eat from Cardiology standpoint at this time.

## 2022-12-14 ENCOUNTER — APPOINTMENT (OUTPATIENT)
Dept: CARDIAC CATH/INVASIVE PROCEDURES | Age: 82
End: 2022-12-14
Payer: MEDICARE

## 2022-12-14 PROBLEM — I25.10 CORONARY ARTERY DISEASE: Status: ACTIVE | Noted: 2022-12-14

## 2022-12-14 PROBLEM — R94.39 ABNORMAL STRESS TEST: Status: ACTIVE | Noted: 2022-12-14

## 2022-12-14 PROBLEM — I25.5 ISCHEMIC CARDIOMYOPATHY: Status: ACTIVE | Noted: 2022-12-14

## 2022-12-14 LAB
POC ACT LR: 269 SEC
POC ACT LR: 300 SEC

## 2022-12-14 PROCEDURE — G0378 HOSPITAL OBSERVATION PER HR: HCPCS

## 2022-12-14 PROCEDURE — 6360000002 HC RX W HCPCS

## 2022-12-14 PROCEDURE — 85347 COAGULATION TIME ACTIVATED: CPT

## 2022-12-14 PROCEDURE — 6370000000 HC RX 637 (ALT 250 FOR IP): Performed by: NURSE PRACTITIONER

## 2022-12-14 PROCEDURE — 93459 L HRT ART/GRFT ANGIO: CPT | Performed by: INTERNAL MEDICINE

## 2022-12-14 PROCEDURE — 6370000000 HC RX 637 (ALT 250 FOR IP): Performed by: INTERNAL MEDICINE

## 2022-12-14 PROCEDURE — C1894 INTRO/SHEATH, NON-LASER: HCPCS

## 2022-12-14 PROCEDURE — 2580000003 HC RX 258: Performed by: NURSE PRACTITIONER

## 2022-12-14 PROCEDURE — 2709999900 HC NON-CHARGEABLE SUPPLY

## 2022-12-14 PROCEDURE — 2580000003 HC RX 258: Performed by: INTERNAL MEDICINE

## 2022-12-14 PROCEDURE — 93459 L HRT ART/GRFT ANGIO: CPT

## 2022-12-14 PROCEDURE — 93571 IV DOP VEL&/PRESS C FLO 1ST: CPT | Performed by: INTERNAL MEDICINE

## 2022-12-14 PROCEDURE — 99152 MOD SED SAME PHYS/QHP 5/>YRS: CPT | Performed by: INTERNAL MEDICINE

## 2022-12-14 PROCEDURE — C1769 GUIDE WIRE: HCPCS

## 2022-12-14 PROCEDURE — C1887 CATHETER, GUIDING: HCPCS

## 2022-12-14 PROCEDURE — 96361 HYDRATE IV INFUSION ADD-ON: CPT

## 2022-12-14 PROCEDURE — 2580000003 HC RX 258

## 2022-12-14 PROCEDURE — 2500000003 HC RX 250 WO HCPCS

## 2022-12-14 RX ORDER — MIRTAZAPINE 15 MG/1
15 TABLET, ORALLY DISINTEGRATING ORAL NIGHTLY
COMMUNITY

## 2022-12-14 RX ORDER — MIRTAZAPINE 15 MG/1
15 TABLET, FILM COATED ORAL NIGHTLY
Status: DISCONTINUED | OUTPATIENT
Start: 2022-12-14 | End: 2022-12-15 | Stop reason: HOSPADM

## 2022-12-14 RX ORDER — MIDAZOLAM HYDROCHLORIDE 1 MG/ML
INJECTION INTRAMUSCULAR; INTRAVENOUS
Status: COMPLETED | OUTPATIENT
Start: 2022-12-14 | End: 2022-12-14

## 2022-12-14 RX ORDER — SODIUM CHLORIDE 9 MG/ML
INJECTION, SOLUTION INTRAVENOUS PRN
Status: DISCONTINUED | OUTPATIENT
Start: 2022-12-14 | End: 2022-12-15 | Stop reason: HOSPADM

## 2022-12-14 RX ORDER — PRAMIPEXOLE DIHYDROCHLORIDE 0.25 MG/1
0.25 TABLET ORAL 2 TIMES DAILY
Status: DISCONTINUED | OUTPATIENT
Start: 2022-12-14 | End: 2022-12-15 | Stop reason: HOSPADM

## 2022-12-14 RX ORDER — LORAZEPAM 0.5 MG/1
0.5 TABLET ORAL NIGHTLY
Status: DISCONTINUED | OUTPATIENT
Start: 2022-12-14 | End: 2022-12-15 | Stop reason: HOSPADM

## 2022-12-14 RX ORDER — HEPARIN SODIUM 1000 [USP'U]/ML
INJECTION, SOLUTION INTRAVENOUS; SUBCUTANEOUS
Status: COMPLETED | OUTPATIENT
Start: 2022-12-14 | End: 2022-12-14

## 2022-12-14 RX ORDER — FERROUS SULFATE 325(65) MG
625 TABLET ORAL 2 TIMES DAILY
COMMUNITY

## 2022-12-14 RX ORDER — HYOSCYAMINE SULFATE 0.125 MG
0.12 TABLET ORAL 3 TIMES DAILY
COMMUNITY

## 2022-12-14 RX ORDER — FENTANYL CITRATE 50 UG/ML
INJECTION, SOLUTION INTRAMUSCULAR; INTRAVENOUS
Status: COMPLETED | OUTPATIENT
Start: 2022-12-14 | End: 2022-12-14

## 2022-12-14 RX ORDER — SODIUM CHLORIDE 0.9 % (FLUSH) 0.9 %
5-40 SYRINGE (ML) INJECTION PRN
Status: DISCONTINUED | OUTPATIENT
Start: 2022-12-14 | End: 2022-12-15 | Stop reason: HOSPADM

## 2022-12-14 RX ORDER — ATORVASTATIN CALCIUM 40 MG/1
20 TABLET, FILM COATED ORAL DAILY
Status: ON HOLD | COMMUNITY
End: 2022-12-15 | Stop reason: HOSPADM

## 2022-12-14 RX ORDER — POLYETHYLENE GLYCOL 3350 17 G/17G
17 POWDER, FOR SOLUTION ORAL DAILY
Status: DISCONTINUED | OUTPATIENT
Start: 2022-12-14 | End: 2022-12-15 | Stop reason: HOSPADM

## 2022-12-14 RX ORDER — OMEPRAZOLE 40 MG/1
40 CAPSULE, DELAYED RELEASE ORAL DAILY
Status: ON HOLD | COMMUNITY
Start: 2022-01-10 | End: 2022-12-15 | Stop reason: HOSPADM

## 2022-12-14 RX ORDER — SODIUM CHLORIDE 0.9 % (FLUSH) 0.9 %
5-40 SYRINGE (ML) INJECTION EVERY 12 HOURS SCHEDULED
Status: DISCONTINUED | OUTPATIENT
Start: 2022-12-14 | End: 2022-12-15 | Stop reason: HOSPADM

## 2022-12-14 RX ORDER — PANTOPRAZOLE SODIUM 20 MG/1
20 TABLET, DELAYED RELEASE ORAL DAILY
Status: DISCONTINUED | OUTPATIENT
Start: 2022-12-14 | End: 2022-12-15 | Stop reason: HOSPADM

## 2022-12-14 RX ORDER — FERROUS SULFATE 325(65) MG
625 TABLET ORAL 2 TIMES DAILY
Status: DISCONTINUED | OUTPATIENT
Start: 2022-12-14 | End: 2022-12-15 | Stop reason: HOSPADM

## 2022-12-14 RX ORDER — ISOSORBIDE MONONITRATE 30 MG/1
30 TABLET, EXTENDED RELEASE ORAL DAILY
Status: DISCONTINUED | OUTPATIENT
Start: 2022-12-14 | End: 2022-12-15 | Stop reason: HOSPADM

## 2022-12-14 RX ORDER — ACETAMINOPHEN 325 MG/1
650 TABLET ORAL EVERY 4 HOURS PRN
Status: DISCONTINUED | OUTPATIENT
Start: 2022-12-14 | End: 2022-12-15 | Stop reason: HOSPADM

## 2022-12-14 RX ADMIN — FENTANYL CITRATE 25 MCG: 50 INJECTION, SOLUTION INTRAMUSCULAR; INTRAVENOUS at 11:01

## 2022-12-14 RX ADMIN — ISOSORBIDE MONONITRATE 30 MG: 30 TABLET, EXTENDED RELEASE ORAL at 15:34

## 2022-12-14 RX ADMIN — MONTELUKAST SODIUM 10 MG: 10 TABLET ORAL at 21:44

## 2022-12-14 RX ADMIN — MIRTAZAPINE 15 MG: 15 TABLET, FILM COATED ORAL at 21:44

## 2022-12-14 RX ADMIN — ASPIRIN 81 MG: 81 TABLET, COATED ORAL at 08:05

## 2022-12-14 RX ADMIN — LOSARTAN POTASSIUM 100 MG: 100 TABLET, FILM COATED ORAL at 08:05

## 2022-12-14 RX ADMIN — LORAZEPAM 0.5 MG: 0.5 TABLET ORAL at 21:44

## 2022-12-14 RX ADMIN — Medication 10 ML: at 08:05

## 2022-12-14 RX ADMIN — PRAMIPEXOLE DIHYDROCHLORIDE 0.25 MG: 0.25 TABLET ORAL at 15:34

## 2022-12-14 RX ADMIN — Medication 10 ML: at 21:50

## 2022-12-14 RX ADMIN — PRAMIPEXOLE DIHYDROCHLORIDE 0.25 MG: 0.25 TABLET ORAL at 21:45

## 2022-12-14 RX ADMIN — PANTOPRAZOLE SODIUM 20 MG: 20 TABLET, DELAYED RELEASE ORAL at 17:31

## 2022-12-14 RX ADMIN — FERROUS SULFATE TAB 325 MG (65 MG ELEMENTAL FE) 650 MG: 325 (65 FE) TAB at 21:44

## 2022-12-14 RX ADMIN — HEPARIN SODIUM 1000 UNITS: 1000 INJECTION, SOLUTION INTRAVENOUS; SUBCUTANEOUS at 11:40

## 2022-12-14 RX ADMIN — HYOSCYAMINE SULFATE 0.12 MG: 0.12 TABLET ORAL; SUBLINGUAL at 21:53

## 2022-12-14 RX ADMIN — Medication 10 ML: at 21:45

## 2022-12-14 RX ADMIN — FENTANYL CITRATE 25 MCG: 50 INJECTION, SOLUTION INTRAMUSCULAR; INTRAVENOUS at 11:02

## 2022-12-14 RX ADMIN — MIDAZOLAM HYDROCHLORIDE 2 MG: 1 INJECTION INTRAMUSCULAR; INTRAVENOUS at 11:02

## 2022-12-14 RX ADMIN — HEPARIN SODIUM 5000 UNITS: 1000 INJECTION, SOLUTION INTRAVENOUS; SUBCUTANEOUS at 11:29

## 2022-12-14 RX ADMIN — ATORVASTATIN CALCIUM 80 MG: 80 TABLET, FILM COATED ORAL at 21:45

## 2022-12-14 RX ADMIN — TRAMADOL HYDROCHLORIDE 50 MG: 50 TABLET ORAL at 19:14

## 2022-12-14 ASSESSMENT — PAIN SCALES - GENERAL
PAINLEVEL_OUTOF10: 2
PAINLEVEL_OUTOF10: 8
PAINLEVEL_OUTOF10: 0
PAINLEVEL_OUTOF10: 0

## 2022-12-14 NOTE — DISCHARGE INSTRUCTIONS
A Follow-up appointment has been made with your primary Cardiologist Dr Marcelo Lu for December 21st at 9:30am     [facility]. [address]  [facility] [address], [facility]      If you need to change appt time call [phone]

## 2022-12-14 NOTE — PRE SEDATION
Brief Pre-Op Note/Sedation Assessment      Bradford Rodas  1940  [id number]  1:51 PM    Planned Procedure: Cardiac Catheterization Procedure  Post Procedure Plan: Return to same level of care  Consent: I have discussed with the patient and/or the patient representative the indication, alternatives, and the possible risks and/or complications of the planned procedure and the anesthesia methods. The patient and/or patient representative appear to understand and agree to proceed. Chief Complaint:   Chest pain, abnormal nuclear SPECT stress test, CAD with remote 2-vessel CABG    Indications for Cath Procedure:  Presentation:  Worsening Angina and Suspected CAD  2. Anginal Classification within 2 weeks:  CCS III - Symptoms with everyday living activities, i.e., moderate limitation  3. Angina Symptoms Assessment:  Atypical Chest Pain  4. Heart Failure Class within last 2 weeks:  No symptoms  5. Cardiovascular Instability:  No    Prior Ischemic Workup/Eval:  Pre-Procedural Medications: Yes: ACE/ARB/ARNI, Aspirin, and STATIN  2. Stress Test Completed? Yes:  Stress or Imaging Studies Performed (within ANY time period):   Type:  Stress Nuclear  Results:  Positive:  Myocardial Perfusion Defects (Nuclear) Extent of Ischemia:  High Risk (>3% annual death or MI)    Does Patient need surgery? Cath Valve Surgery:  No    Pre-Procedure Medical History:  Vital Signs:  BP (!) 147/69   Pulse 72   Temp 97.9 °F (36.6 °C) (Oral)   Resp 18   Ht 5' 10\" (1.778 m)   Wt 146 lb 14.4 oz (66.6 kg)   SpO2 97%   BMI 21.08 kg/m²     Allergies:     Allergies   Allergen Reactions    Bromocriptine Other (See Comments)     PARLODEL-TOUGH TIME GETTING OUT OF BED    Gabapentin Other (See Comments)     Other reaction(s): Unknown    Zetia [Ezetimibe] Other (See Comments)     UNKNOWN     Medications:    Current Facility-Administered Medications   Medication Dose Route Frequency Provider Last Rate Last Admin    sodium chloride flush 0.9 % injection 5-40 mL  5-40 mL IntraVENous 2 times per day Vitaly Haddox, DO        sodium chloride flush 0.9 % injection 5-40 mL  5-40 mL IntraVENous PRN Vitaly Haddox, DO        0.9 % sodium chloride infusion   IntraVENous PRN Vitaly Rubalcavadox, DO        acetaminophen (TYLENOL) tablet 650 mg  650 mg Oral Q4H PRN Vitaly Rubalcavadox, DO        isosorbide mononitrate (IMDUR) extended release tablet 30 mg  30 mg Oral Daily Vitaly Rubalcavadox, DO        aspirin EC tablet 81 mg  81 mg Oral Daily Duane Pompa APRN - CNP   81 mg at 12/14/22 0805    losartan (COZAAR) tablet 100 mg  100 mg Oral Daily Duane Pompa, APRN - CNP   100 mg at 12/14/22 0805    montelukast (SINGULAIR) tablet 10 mg  10 mg Oral Nightly Duane Pompa, APRN - CNP        pramipexole (MIRAPEX) tablet 0.25 mg  0.25 mg Oral Nightly Duane Pompa, APRN - CNP   0.25 mg at 12/13/22 2208    traMADol (ULTRAM) tablet 50 mg  50 mg Oral Q6H PRN Duane Pompa, APRN - CNP        sodium chloride flush 0.9 % injection 5-40 mL  5-40 mL IntraVENous 2 times per day Duane Pompa APRN - CNP   10 mL at 12/14/22 0805    sodium chloride flush 0.9 % injection 5-40 mL  5-40 mL IntraVENous PRN Duane Pompa APRN - CNP        0.9 % sodium chloride infusion   IntraVENous PRN Duane Pompa APRN - CNP        ondansetron (ZOFRAN-ODT) disintegrating tablet 4 mg  4 mg Oral Q8H PRN Duane Pompa APRN - SANGEETA        Or    ondansetron (ZOFRAN) injection 4 mg  4 mg IntraVENous Q6H PRN Duane Pompa APRN - SANGEETA        acetaminophen (TYLENOL) tablet 650 mg  650 mg Oral Q6H PRN Duane Pompa, APRN - CNP        Or    acetaminophen (TYLENOL) suppository 650 mg  650 mg Rectal Q6H PRN Duane Pompa APRN - CNP        polyethylene glycol (GLYCOLAX) packet 17 g  17 g Oral Daily PRN Duane Pompa APRN - CNP        perflutren lipid microspheres (DEFINITY) injection 1.5 mL  1.5 mL IntraVENous ONCE PRN CE Ornelas CNP        nitroGLYCERIN (NITROSTAT) SL tablet 0.4 mg  0.4 mg SubLINGual Q5 Min PRN CE Ornelas - CNP        enoxaparin (LOVENOX) injection 40 mg  40 mg SubCUTAneous Daily Heidi Azam, APRN - CNP        atorvastatin (LIPITOR) tablet 80 mg  80 mg Oral Nightly Ronny Albarran MD   80 mg at 12/13/22 2208    LORazepam (ATIVAN) tablet 0.5 mg  0.5 mg Oral Nightly PRN Heidi Azam, APRN - CNP   0.5 mg at 12/13/22 2207    mirtazapine (REMERON) tablet 30 mg  30 mg Oral Nightly Heidi Azam, APRN - CNP   30 mg at 12/13/22 2208       Past Medical History:    Past Medical History:   Diagnosis Date    Arthritis     Benign neoplasm of colon     Bronchitis     CAD (coronary artery disease)     Cervicalgia     Chronic prostatitis     Coronary atherosclerosis of unspecified type of vessel, native or graft     Depressive disorder, not elsewhere classified     Esophageal reflux     Generalized anxiety disorder     Hyperlipidemia     Hypertension     Impotence of organic origin     Insomnia, unspecified     Irritable bowel syndrome     Lumbago     Orthodontics     UPPER FRONT BRIDGE/CAPS    Other diseases of lung, not elsewhere classified     CT OF LUNGS, 2/07, 5/07, 4/08    Unspecified essential hypertension        Surgical History:    Past Surgical History:   Procedure Laterality Date    BALLOON ANGIOPLASTY, ARTERY  2002, 2004    PERIPH ART    BYPASS GRAFT  2006    COMPOS/2 VEINS/2 2 [facility]  7/31/2012    ANTERIOR CERVICAL DISCECTOMY AND FUSION C7-T1    COLONOSCOPY  2003    CORONARY ANGIOPLASTY WITH STENT PLACEMENT  2001, 2004    TRANSCATH STENT EACH ADDN VESSL,OPEN    CORONARY ARTERY BYPASS GRAFT  10/2008    TWO    COSMETIC SURGERY      DEVIATED SEPTUM    ENDOSCOPY, COLON, DIAGNOSTIC      EYE SURGERY      CATARACT, LEFT, INSERT LENS             Pre-Sedation:  Pre-Sedation Documentation and Exam:  I have assessed the patient and reviewed the H&P on the chart.     Prior History of Anesthesia Complications:   none    Modified Mallampati:  II (soft palate, uvula, fauces visible)    ASA Classification:  Class 3 - A patient with severe systemic disease that limits activity but is not incapacitating    Francesco Scale: Activity:  2 - Able to move 4 extremities voluntarily on command  Respiration:  2 - Able to breathe deeply and cough freely  Circulation:  2 - BP+/- 20mmHg of normal  Consciousness:  2 - Fully awake  Oxygen Saturation (color):  2 - Able to maintain oxygen saturation >92% on room air    Sedation/Anesthesia Plan:  Guard the patient's safety and welfare. Minimize physical discomfort and pain. Minimize negative psychological responses to treatment by providing sedation and analgesia and maximize the potential amnesia. Patient to meet pre-procedure discharge plan.     Medication Planned:  midazolam intravenously and fentanyl intravenously    Patient is an appropriate candidate for plan of sedation:   yes      Electronically signed by Corey Strickland DO on 12/14/2022 at 1:51 PM

## 2022-12-14 NOTE — PROCEDURES
CARDIAC CATHETERIZATION REPORT    Date of Procedure: 12/14/2022  : Cesia Douglass DO  Primary Indication: Chest pain, abnormal nuclear SPECT stress test, CAD with remote 2-vessel CABG    Procedures Performed:  1. Coronary angiography  2. Left heart catheterization  3. Left subclavian angiography  4. LIMA angiography  5. Saphenous vein graft angiography  6. FFR of proximal/mid-RCA  7. Right femoral angiography  8. Moderate conscious sedation    Procedural Details:  Access: Local anesthetic was given and access was obtained in the right femoral artery using a micropuncture technique and ultrasound guidance and a 6F sheath was placed without difficulty. Diagnostic: 5F JR4 and 5F JL4 catheters were used to perform selective right and left coronary angiography. The 5F JR4 catheter was used to perform saphenous vein graft angiography and left subclavian angiography. A 5F MARY catheter was used to perform LIMA angiography. The 5F JR4 catheter was used to perform the left heart catheterization. No significant gradient was observed on pull-back of the catheter across the aortic valve. FFR of proximal/mid-RCA: Therapeutic ACT was achieved with the administration of IV heparin. Using a 6F JR4 guide catheter, a Pressure Wire X was equalized and then advanced across the long proximal/mid-RCA stenosis and into the distal vessel. Baseline Pd/Pa was 0.99 and after maximal hyperemia with IV adenosine at 140 mcg/kg/min x3 minutes, the FFR was 0.95. No drift was observed on pull-back of the wire into the guide catheter. Hemostasis: At the end of the procedure, the right femoral arterial sheath was sutured in place and will be removed once the ACT is less than 180 seconds. Findings:  Hemodynamics:     A. Opening arterial pressure: 109/46 (69) mmHg      B. LVEDP: 6 mmHg    2. Coronary anatomy:  A. Left main artery: The left main artery bifurcates into the left anterior descending artery and left circumflex artery. The left main artery is diffusely diseased and calcified with a distal 95% stenosis. B. Left anterior descending artery: The LAD has a 95% ostial stenosis. There is a long stent extending from the proximal to mid-vessel. There is a 100% in-stent restenosis in the mid-vessel. C. Left circumflex artery: the LCx is calcified and has a 100% ostial stenosis. D. Right coronary artery: Dominant vessel. The RCA is diffusely diseased and calcified with a 30% ostial stenosis and long 50-60% proximal to mid-vessel stenosis. Beyond this, there is another 40% stenosis in the mid-RCA and a 30% stenosis in the distal RCA. The RPDA is smaller in caliber than the distal RCA and has a 40% proximal stenosis and 50% mid-vessel stenosis. The RPLB has a 20-30% stenosis. 3.  Coronary anatomy:  A. Left subclavian artery: The left subclavian artery is calcified with a 40% proximal stenosis. B. LIMA to LAD: The LIMA is widely patent. The distal LAD has minor luminal irregularities beyond the anastomosis. There is retrograde filling to the mid-LAD and of multiple small diagonal branches. C. SVG to OM1: The vein graft is patent with a 30-40% ostial stenosis and 30% proximal to mid-segment stenosis. The OM1 has mild disease beyond the anastomosis. There is retrograde filing to the proximal LCx and of a more distal OM branch. 4. Right femoral angiography:  A. The right common femoral artery is calcified with a 40-50% stenosis and bifurcates into the right superficial femoral artery and right profunda femoris artery below the level of the inferior margin of the femoral head. The sheath enters the right common femoral artery above the bifurcation over the mid-level of the femoral head. FFR of proximal/mid-RCA stenosis  Baseline Pd/Pa - 0.99  Hyperemic FFR - 0.95    Technical Factors:  Complications:  None.   Estimated blood loss: Minimal.  Radiation:  Air kerma 581 mGy and 12.1 minutes of fluoroscopy  Sedation: Moderate conscious sedation was administered by qualified nursing personnel under continuous hemodynamic monitoring, starting at 11:00 AM and ending at 11:50 AM.  Medications: 2 mg IV Versed, 50 mcg IV Fentanyl, 6,000 units IV heparin  Contrast: 60 cc of Isovue     Impression:  1. Severe distal left main and multivessel coronary artery disease with patent LIMA to LAD and patent SVG to OM. 2. The native RCA was not previously bypassed and is diffusely diseased and calcified with a long 50-60% proximal to mid-vessel stenosis that was not physiologically significant by FFR at 0.95. There RPDA has moderate disease as well. 3. Normal LVEDP. Plan:  1. Recommend management with guideline-directed medical therapy. 2. Continue aspirin 81 mg daily, atorvastatin 40 mg daily, and losartan 100 mg daily. 3. Start imdur 30 mg daily. 4. Not on beta-blocker due to bradycardia. 5. Follow-up with Dr. Emerson Tobar at [facility] in 1-2 weeks.       Cesia Douglass, [address] [place] [address] Road

## 2022-12-14 NOTE — PROGRESS NOTES
Pt back from Cath Lab. Pt instructed on importance of bedrest until 2045. VSS. Right fem site clean dry intact and soft. Pt's wife out in the hallway talking to other visitor and states \"The medication for his restless legs is not ordered yet and they do not want me to give him his medications but once she leaves the room I am going to give it to him. \" This RN instructed wife to not give any medications without MD approval especially after this procedure. Informed wife that medications was ordered by Kristofer Gutierrez MD and we are awaiting pharmacy verification.

## 2022-12-14 NOTE — PROGRESS NOTES
Report given to Cassi Mayes RN pt reports no needs at this time, bed in lowest position, call light within reach

## 2022-12-14 NOTE — PROGRESS NOTES
Hospitalist Progress Note      PCP: Josep Galeano MD    Date of Admission: 12/12/2022    Chief Complaint: Chest pain    Hospital Course:   80 y.o. male, with past medical history of hypertension, hyperlipidemia and CAD, who [facility] to [facility] with [facility] pain. History obtained from patient and review of EMR. The patient stated this evening he was sitting in his recliner watching television when he developed 6/10 right-sided chest pain. He describes the pain as sharp and stabbing pain that radiated to the right and left side of his neck, but was not associated with any other symptoms. The patient stated he did take 1 nitro tablet and the pain subsided. the patient stated he does have a history of CAD with a CABG in 2008. He stated he does follow with Dr. Meghna Knowles from cardiology at [facility].  In the emergency room the patient had a negative troponin as well as a nonischemic EKG. The patient was admitted for further evaluation and treatment. A stress test and echocardiogram have been ordered for the a.m. Subjective: S/p cath lab with no additional stents needed. Without chest pain. Complains of RLS and requesting pramipexole.  No other complaints        Medications:  Reviewed    Infusion Medications    sodium chloride      sodium chloride       Scheduled Medications    sodium chloride flush  5-40 mL IntraVENous 2 times per day    isosorbide mononitrate  30 mg Oral Daily    aspirin EC  81 mg Oral Daily    losartan  100 mg Oral Daily    montelukast  10 mg Oral Nightly    pramipexole  0.25 mg Oral Nightly    sodium chloride flush  5-40 mL IntraVENous 2 times per day    enoxaparin  40 mg SubCUTAneous Daily    atorvastatin  80 mg Oral Nightly    mirtazapine  30 mg Oral Nightly     PRN Meds: sodium chloride flush, sodium chloride, acetaminophen, traMADol, sodium chloride flush, sodium chloride, ondansetron **OR** ondansetron, acetaminophen **OR** acetaminophen, polyethylene glycol, perflutren lipid microspheres, nitroGLYCERIN, LORazepam      Intake/Output Summary (Last 24 hours) at 12/14/2022 1504  Last data filed at 12/14/2022 0810  Gross per 24 hour   Intake 0 ml   Output 0 ml   Net 0 ml       Physical Exam Performed:    BP (!) 147/69   Pulse 72   Temp 97.9 °F (36.6 °C) (Oral)   Resp 18   Ht 5' 10\" (1.778 m)   Wt 146 lb 14.4 oz (66.6 kg)   SpO2 97%   BMI 21.08 kg/m²     General appearance: No apparent distress, appears stated age and cooperative. HEENT: Pupils equal, round, and reactive to light. Conjunctivae/corneas clear. Neck: Supple, with full range of motion. No jugular venous distention. Trachea midline. Respiratory:  Normal respiratory effort. Clear to auscultation, bilaterally without Rales/Wheezes/Rhonchi. Cardiovascular: Regular rate and rhythm with normal S1/S2 without murmurs, rubs or gallops. Abdomen: Soft, non-tender, non-distended with normal bowel sounds. Musculoskeletal: No clubbing, cyanosis or edema bilaterally. Full range of motion without deformity. Skin: Skin color, texture, turgor normal.  No rashes or lesions. Neurologic:  Neurovascularly intact without any focal sensory/motor deficits.  Cranial nerves: II-XII intact, grossly non-focal.  Psychiatric: Alert and oriented, thought content appropriate, normal insight  Capillary Refill: Brisk, 3 seconds, normal   Peripheral Pulses: +2 palpable, equal bilaterally       Labs:   Recent Labs     12/12/22 2325 12/13/22 0637   WBC 8.1 6.9   HGB 13.2* 13.3*   HCT 39.2* 40.0*    288     Recent Labs     12/12/22 2325 12/13/22 0637   * 137   K 4.3 5.1   CL 96* 102   CO2 24 27   BUN 17 15   CREATININE 1.2 0.9   CALCIUM 9.1 8.6     Recent Labs     12/12/22 2325   AST 14*   ALT 13   BILITOT 0.4   ALKPHOS 86     Recent Labs     12/12/22 2325   INR 1.08     Recent Labs     12/12/22 2325 12/13/22  0210 12/13/22 0637   CKTOTAL 58  --   --    TROPONINI <0.01 <0.01 <0.01       Urinalysis:      Lab Results Component Value Date/Time    BLOODU Trace intact 03/19/2013 01:34 PM    SPECGRAV <=1.005 03/19/2013 01:34 PM    GLUCOSEU Negative 03/19/2013 01:34 PM       Radiology:  NM Cardiac Stress Test Nuclear Imaging   Final Result      XR CHEST PORTABLE   Final Result   Minimal basilar atelectasis, otherwise no acute process. IP CONSULT TO CARDIOLOGY    Assessment/Plan:    Active Hospital Problems    Diagnosis     Abnormal stress test [R94.39]      Priority: Medium    Coronary artery disease [I25.10]      Priority: Medium    Ischemic cardiomyopathy [I25.5]      Priority: Medium    Hyperlipidemia [E78.5]     HTN (hypertension) [I10]     Chest pain [R07.9]    Chest pain/abnormal nuclear SPECT stress test -   Nuclear SPECT imaging demonstrated a moderate-sized basal to mid-anterolateral area of mixed ischemia/scar. S/p Cath lab that did not require intervention. Cardiology recommended management with guideline directed medical therapy. Continue ASA 81 daily, statin, and losartan. Start Imdur 30 mg daily. Not on BB due to bradycardia. He is to f/u with Dr Grace Hines at [facility] in 1-2 weeks. Monitor access site overnight     CAD - S/p PCI to LAD in 2001 PCI to RCA in 2004 followed by CABG in 2008. Treatment as above      Ischemic cardiomyopathy  Repeat TTE today showed improvement in LVEF to 55%. Currently appears to be near a clinically euvolemic state. Continue losartan 100 mg daily. Bb on hold    HTN - essential. Currently controlled on home meds w/ vitals reviewed and documented as above.    non-Hodgkin's lymphoma in remission     HyperLipidemia - controlled on home Statin. Continue, w/ f/u and med adjustment w/ PCP    COPD - w/out chronic respiratory failure on no baseline home O2. Controlled on home medication regimen - continued. RLS - continue pramipexole BID      DVT Prophylaxis: Lovenox  Diet: ADULT DIET;  Regular  Code Status: Full Code  PT/OT Eval Status: Not indicated    Dispo - Tomorrow AM    Appropriate for A1 Discharge Unit: Yes      José Thompson, DO

## 2022-12-15 VITALS
DIASTOLIC BLOOD PRESSURE: 67 MMHG | HEIGHT: 70 IN | SYSTOLIC BLOOD PRESSURE: 108 MMHG | OXYGEN SATURATION: 94 % | WEIGHT: 144.2 LBS | BODY MASS INDEX: 20.64 KG/M2 | HEART RATE: 82 BPM | TEMPERATURE: 97.5 F | RESPIRATION RATE: 18 BRPM

## 2022-12-15 LAB
POC ACT LR: 165 SEC
POC ACT LR: 206 SEC

## 2022-12-15 PROCEDURE — G0378 HOSPITAL OBSERVATION PER HR: HCPCS

## 2022-12-15 RX ORDER — ATORVASTATIN CALCIUM 40 MG/1
40 TABLET, FILM COATED ORAL NIGHTLY
Qty: 30 TABLET | Refills: 0 | Status: SHIPPED | OUTPATIENT
Start: 2022-12-15 | End: 2023-01-14

## 2022-12-15 RX ORDER — ISOSORBIDE MONONITRATE 30 MG/1
30 TABLET, EXTENDED RELEASE ORAL DAILY
Qty: 30 TABLET | Refills: 3 | Status: SHIPPED | OUTPATIENT
Start: 2022-12-15

## 2022-12-15 ASSESSMENT — PAIN SCALES - GENERAL
PAINLEVEL_OUTOF10: 0

## 2022-12-15 NOTE — PROGRESS NOTES
Pt ok for discharge per MD. Discharge instructions education provided, medications reviewed, no questions or concerns. IV removed. Telemetry removed, [facility] notified. Transported to personal vehicle with all belongings.

## 2022-12-16 NOTE — DISCHARGE SUMMARY
Hospital Medicine Discharge Summary    Patient ID: Sona Persaud      Patient's PCP: Nilo Castro MD    Admit Date: 12/12/2022     Discharge Date: 12/15/2022      Admitting Provider: Debbie Almeida MD     Discharge Provider: Monalisa Boucher DO     Discharge Diagnoses: Active Hospital Problems    Diagnosis     Abnormal stress test [R94.39]      Priority: Medium    Coronary artery disease [I25.10]      Priority: Medium    Ischemic cardiomyopathy [I25.5]      Priority: Medium    Hyperlipidemia [E78.5]     HTN (hypertension) [I10]     Chest pain [R07.9]        The patient was seen and examined on day of discharge and this discharge summary is in conjunction with any daily progress note from day of discharge. Hospital Course:   80 y.o. male, with past medical history of hypertension, hyperlipidemia and CAD, who presented to [facility] with chest pain. History obtained from patient and review of EMR. The patient stated this evening he was sitting in his recliner watching television when he developed 6/10 right-sided chest pain. Went to cath and had  Severe distal left main and multivessel coronary artery disease with patent LIMA to LAD and patent SVG to OM. The native RCA was not previously bypassed and is diffusely diseased and calcified with a long 50-60% proximal to mid-vessel stenosis that was not physiologically significant by FFR at 0.95. There RPDA has moderate disease as well. Normal LVEDP. Cardiology recs  1. Recommend management with guideline-directed medical therapy. 2. Continue aspirin 81 mg daily, atorvastatin 40 mg daily, and losartan 50 mg daily. 3. Start imdur 30 mg daily. 4. Not on beta-blocker due to bradycardia. 5. Follow-up with Dr. Radha Piper at [facility] in 1-2 weeks. CAD - S/p PCI to LAD in 2001 PCI to RCA in 2004 followed by CABG in 2008. Treatment as above      Ischemic cardiomyopathy  Repeat TTE today showed improvement in LVEF to 55%.   Currently appears to be near a clinically euvolemic state. Continue losartan daily. Bb on hold     HTN - essential. Currently controlled on home meds w/ vitals reviewed and documented as above.     non-Hodgkin's lymphoma in remission     HyperLipidemia - controlled on home Statin. Continue, w/ f/u and med adjustment w/ PCP    COPD - w/out chronic respiratory failure on no baseline home O2. Controlled on home medication regimen - continued. RLS - continue pramipexole BID     Physical Exam Performed:     /67   Pulse 82   Temp 97.5 °F (36.4 °C) (Oral)   Resp 18   Ht 5' 10\" (1.778 m)   Wt 144 lb 3.2 oz (65.4 kg)   SpO2 94%   BMI 20.69 kg/m²       General appearance:  No apparent distress, appears stated age and cooperative. HEENT:  Normal cephalic, atraumatic without obvious deformity. Pupils equal, round, and reactive to light. Extra ocular muscles intact. Conjunctivae/corneas clear. Neck: Supple, with full range of motion. No jugular venous distention. Trachea midline. Respiratory:  Normal respiratory effort. Clear to auscultation, bilaterally without Rales/Wheezes/Rhonchi. Cardiovascular:  Regular rate and rhythm with normal S1/S2 without murmurs, rubs or gallops. Abdomen: Soft, non-tender, non-distended with normal bowel sounds. Musculoskeletal:  No clubbing, cyanosis or edema bilaterally. Full range of motion without deformity. Skin: Skin color, texture, turgor normal.  No rashes or lesions. Neurologic:  Neurovascularly intact without any focal sensory/motor deficits. Cranial nerves: II-XII intact, grossly non-focal.  Psychiatric:  Alert and oriented, thought content appropriate, normal insight  Capillary Refill: Brisk,< 3 seconds   Peripheral Pulses: +2 palpable, equal bilaterally       Labs:  For convenience and continuity at follow-up the following most recent labs are provided:      CBC:    Lab Results   Component Value Date/Time    WBC 6.9 12/13/2022 06:37 AM    HGB 13.3 12/13/2022 06:37 AM    HCT 40.0 12/13/2022 06:37 AM     12/13/2022 06:37 AM       Renal:    Lab Results   Component Value Date/Time     12/13/2022 06:37 AM    K 5.1 12/13/2022 06:37 AM     12/13/2022 06:37 AM    CO2 27 12/13/2022 06:37 AM    BUN 15 12/13/2022 06:37 AM    CREATININE 0.9 12/13/2022 06:37 AM    CALCIUM 8.6 12/13/2022 06:37 AM         Significant Diagnostic Studies    Radiology:   NM Cardiac Stress Test Nuclear Imaging   Final Result      XR CHEST PORTABLE   Final Result   Minimal basilar atelectasis, otherwise no acute process. Consults:     IP CONSULT TO CARDIOLOGY    Disposition:  Home     Condition at Discharge: Stable    Discharge Instructions/Follow-up:  cardiology f/u as above    Code Status:  Prior     Activity: activity as tolerated    Diet: cardiac diet      Discharge Medications:     Discharge Medication List as of 12/15/2022  8:36 AM             Details   isosorbide mononitrate (IMDUR) 30 MG extended release tablet Take 1 tablet by mouth daily, Disp-30 tablet, R-3Normal                Details   atorvastatin (LIPITOR) 40 MG tablet Take 1 tablet by mouth nightly, Disp-30 tablet, R-0Normal                Details   ferrous sulfate (IRON 325) 325 (65 Fe) MG tablet Take 625 mg by mouth in the morning and at bedtimeHistorical Med      mirtazapine (REMERON SOL-TAB) 15 MG disintegrating tablet Take 15 mg by mouth nightlyHistorical Med      Omeprazole 20 MG TBDD Take 20 mg by mouth dailyHistorical Med      hyoscyamine (ANASPAZ;LEVSIN) 125 MCG tablet Take 0.125 mg by mouth 3 times dailyHistorical Med      !! nitroGLYCERIN (NITROSTAT) 0.4 MG SL tablet Place 1 tablet under the tongue every 5 minutes as needed for Chest pain., Disp-25 tablet, R-0      aspirin EC 81 MG EC tablet Take 1 tablet by mouth daily. , Disp-30 tablet, R-3      losartan (COZAAR) 100 MG tablet Take 100 mg by mouth daily. Historical Med      pramipexole (MIRAPEX) 0.25 MG tablet Take 0.25 mg by mouth nightly.       triazolam (HALCION) 0.25 MG tablet Take 0.25 mg by mouth nightly as needed. polyethylene glycol (GLYCOLAX) powder Take 17 g by mouth daily. !! nitroGLYCERIN (NITROSTAT) 0.4 MG SL tablet Place 0.4 mg under the tongue every 5 minutes as needed. !! - Potential duplicate medications found. Please discuss with provider. Time Spent on discharge: 33 minutes in the examination, evaluation, counseling and review of medications and discharge plan. Signed:    Snow Du DO   12/16/2022      Thank you Jesus Alberto Manzo MD for the opportunity to be involved in this patient's care. If you have any questions or concerns, please feel free to contact me at [phone].

## 2025-08-30 ENCOUNTER — APPOINTMENT (OUTPATIENT)
Dept: CT IMAGING | Age: 85
End: 2025-08-30

## 2025-08-30 ENCOUNTER — HOSPITAL ENCOUNTER (EMERGENCY)
Age: 85
Discharge: HOME | End: 2025-08-30

## 2025-08-30 VITALS
RESPIRATION RATE: 18 BRPM | DIASTOLIC BLOOD PRESSURE: 84 MMHG | HEART RATE: 98 BPM | OXYGEN SATURATION: 97 % | HEIGHT: 70 IN | SYSTOLIC BLOOD PRESSURE: 162 MMHG | BODY MASS INDEX: 20.76 KG/M2 | WEIGHT: 145 LBS | TEMPERATURE: 98.4 F

## 2025-08-30 DIAGNOSIS — K59.00 CONSTIPATION, UNSPECIFIED CONSTIPATION TYPE: Primary | ICD-10-CM

## 2025-08-30 LAB
ALBUMIN SERPL-MCNC: 3.5 G/DL (ref 3.4–5)
ALP SERPL-CCNC: 101 U/L (ref 40–129)
ALT SERPL-CCNC: 10 U/L (ref 10–40)
ANION GAP SERPL CALCULATED.3IONS-SCNC: 10 MMOL/L (ref 3–16)
AST SERPL-CCNC: 14 U/L (ref 15–37)
BASOPHILS # BLD: 0.1 K/UL (ref 0–0.2)
BASOPHILS NFR BLD: 1.1 %
BILIRUB DIRECT SERPL-MCNC: 0.3 MG/DL (ref 0–0.3)
BILIRUB INDIRECT SERPL-MCNC: 0.3 MG/DL (ref 0–1)
BILIRUB SERPL-MCNC: 0.6 MG/DL (ref 0–1)
BUN SERPL-MCNC: 13 MG/DL (ref 7–20)
CALCIUM SERPL-MCNC: 8.9 MG/DL (ref 8.3–10.6)
CHLORIDE SERPL-SCNC: 96 MMOL/L (ref 99–110)
CO2 SERPL-SCNC: 25 MMOL/L (ref 21–32)
CREAT SERPL-MCNC: 0.8 MG/DL (ref 0.8–1.3)
DEPRECATED RDW RBC AUTO: 14 % (ref 12.4–15.4)
EOSINOPHIL # BLD: 0 K/UL (ref 0–0.6)
EOSINOPHIL NFR BLD: 0.3 %
GFR SERPLBLD CREATININE-BSD FMLA CKD-EPI: 87 ML/MIN/{1.73_M2}
GLUCOSE SERPL-MCNC: 102 MG/DL (ref 70–99)
HCT VFR BLD AUTO: 29.5 % (ref 40.5–52.5)
HEMOCCULT STL QL: NORMAL
HGB BLD-MCNC: 10.1 G/DL (ref 13.5–17.5)
LIPASE SERPL-CCNC: 20 U/L (ref 13–60)
LYMPHOCYTES # BLD: 0.5 K/UL (ref 1–5.1)
LYMPHOCYTES NFR BLD: 6.2 %
MCH RBC QN AUTO: 30.3 PG (ref 26–34)
MCHC RBC AUTO-ENTMCNC: 34.4 G/DL (ref 31–36)
MCV RBC AUTO: 88.2 FL (ref 80–100)
MONOCYTES # BLD: 1.1 K/UL (ref 0–1.3)
MONOCYTES NFR BLD: 15.5 %
NEUTROPHILS # BLD: 5.6 K/UL (ref 1.7–7.7)
NEUTROPHILS NFR BLD: 76.9 %
PLATELET # BLD AUTO: 340 K/UL (ref 135–450)
PMV BLD AUTO: 6.8 FL (ref 5–10.5)
POTASSIUM SERPL-SCNC: 4.1 MMOL/L (ref 3.5–5.1)
PROT SERPL-MCNC: 6.5 G/DL (ref 6.4–8.2)
RBC # BLD AUTO: 3.34 M/UL (ref 4.2–5.9)
SODIUM SERPL-SCNC: 131 MMOL/L (ref 136–145)
WBC # BLD AUTO: 7.3 K/UL (ref 4–11)

## 2025-08-30 PROCEDURE — 80076 HEPATIC FUNCTION PANEL: CPT

## 2025-08-30 PROCEDURE — 82270 OCCULT BLOOD FECES: CPT

## 2025-08-30 PROCEDURE — 74176 CT ABD & PELVIS W/O CONTRAST: CPT

## 2025-08-30 PROCEDURE — 85025 COMPLETE CBC W/AUTO DIFF WBC: CPT

## 2025-08-30 PROCEDURE — 99284 EMERGENCY DEPT VISIT MOD MDM: CPT

## 2025-08-30 PROCEDURE — 83690 ASSAY OF LIPASE: CPT

## 2025-08-30 PROCEDURE — 6370000000 HC RX 637 (ALT 250 FOR IP): Performed by: PHYSICIAN ASSISTANT

## 2025-08-30 PROCEDURE — 80048 BASIC METABOLIC PNL TOTAL CA: CPT

## 2025-08-30 PROCEDURE — 36415 COLL VENOUS BLD VENIPUNCTURE: CPT

## 2025-08-30 RX ADMIN — MAGNESIUM HYDROXIDE 30 ML: 400 SUSPENSION ORAL at 19:07

## 2025-08-30 RX ADMIN — MINERAL OIL 330 ML: 999 LIQUID ORAL at 18:23

## 2025-08-30 ASSESSMENT — PAIN - FUNCTIONAL ASSESSMENT: PAIN_FUNCTIONAL_ASSESSMENT: 0-10

## 2025-08-30 ASSESSMENT — PAIN SCALES - GENERAL: PAINLEVEL_OUTOF10: 0
